# Patient Record
Sex: FEMALE | Race: WHITE | NOT HISPANIC OR LATINO
[De-identification: names, ages, dates, MRNs, and addresses within clinical notes are randomized per-mention and may not be internally consistent; named-entity substitution may affect disease eponyms.]

---

## 2017-01-04 ENCOUNTER — APPOINTMENT (OUTPATIENT)
Dept: INTERNAL MEDICINE | Facility: CLINIC | Age: 47
End: 2017-01-04

## 2017-01-04 VITALS
TEMPERATURE: 98.4 F | HEART RATE: 73 BPM | WEIGHT: 152 LBS | SYSTOLIC BLOOD PRESSURE: 121 MMHG | HEIGHT: 67 IN | RESPIRATION RATE: 17 BRPM | OXYGEN SATURATION: 95 % | BODY MASS INDEX: 23.86 KG/M2 | DIASTOLIC BLOOD PRESSURE: 80 MMHG

## 2017-01-05 ENCOUNTER — APPOINTMENT (OUTPATIENT)
Dept: GASTROENTEROLOGY | Facility: CLINIC | Age: 47
End: 2017-01-05

## 2017-01-05 LAB
ANION GAP SERPL CALC-SCNC: 16 MMOL/L
BASOPHILS # BLD AUTO: 0.01 K/UL
BASOPHILS NFR BLD AUTO: 0.2 %
BUN SERPL-MCNC: 9 MG/DL
CALCIUM SERPL-MCNC: 9.1 MG/DL
CHLORIDE SERPL-SCNC: 102 MMOL/L
CO2 SERPL-SCNC: 23 MMOL/L
CREAT SERPL-MCNC: 0.74 MG/DL
EOSINOPHIL # BLD AUTO: 0.07 K/UL
EOSINOPHIL NFR BLD AUTO: 1.2 %
GLUCOSE SERPL-MCNC: 88 MG/DL
HCT VFR BLD CALC: 42 %
HGB BLD-MCNC: 13.4 G/DL
IMM GRANULOCYTES NFR BLD AUTO: 0.2 %
LYMPHOCYTES # BLD AUTO: 0.64 K/UL
LYMPHOCYTES NFR BLD AUTO: 11.2 %
MAN DIFF?: NORMAL
MCHC RBC-ENTMCNC: 27.3 PG
MCHC RBC-ENTMCNC: 31.9 GM/DL
MCV RBC AUTO: 85.5 FL
MONOCYTES # BLD AUTO: 0.5 K/UL
MONOCYTES NFR BLD AUTO: 8.7 %
NEUTROPHILS # BLD AUTO: 4.49 K/UL
NEUTROPHILS NFR BLD AUTO: 78.5 %
PLATELET # BLD AUTO: 215 K/UL
POTASSIUM SERPL-SCNC: 4.3 MMOL/L
RBC # BLD: 4.91 M/UL
RBC # FLD: 14.6 %
SODIUM SERPL-SCNC: 141 MMOL/L
WBC # FLD AUTO: 5.72 K/UL

## 2017-01-06 ENCOUNTER — APPOINTMENT (OUTPATIENT)
Dept: CT IMAGING | Facility: CLINIC | Age: 47
End: 2017-01-06

## 2017-01-06 ENCOUNTER — OUTPATIENT (OUTPATIENT)
Dept: OUTPATIENT SERVICES | Facility: HOSPITAL | Age: 47
LOS: 1 days | End: 2017-01-06
Payer: MEDICAID

## 2017-01-06 ENCOUNTER — APPOINTMENT (OUTPATIENT)
Dept: PULMONOLOGY | Facility: CLINIC | Age: 47
End: 2017-01-06

## 2017-01-06 ENCOUNTER — MESSAGE (OUTPATIENT)
Age: 47
End: 2017-01-06

## 2017-01-06 DIAGNOSIS — R05 COUGH: ICD-10-CM

## 2017-01-06 DIAGNOSIS — R50.9 FEVER, UNSPECIFIED: ICD-10-CM

## 2017-01-06 PROCEDURE — 71250 CT THORAX DX C-: CPT

## 2017-01-06 RX ORDER — IPRATROPIUM BROMIDE AND ALBUTEROL SULFATE 2.5; .5 MG/3ML; MG/3ML
0.5-2.5 (3) SOLUTION RESPIRATORY (INHALATION)
Qty: 3 | Refills: 1 | Status: ACTIVE | COMMUNITY
Start: 1900-01-01 | End: 1900-01-01

## 2017-01-06 RX ORDER — LORAZEPAM 1 MG/1
1 TABLET ORAL
Refills: 0 | Status: ACTIVE | COMMUNITY

## 2017-01-09 LAB
FLUAV H1 2009 PAND RNA SPEC QL NAA+PROBE: DETECTED
HCOV OC43 RNA SPEC QL NAA+PROBE: DETECTED
RAPID RVP RESULT: DETECTED

## 2017-01-10 LAB
B PERT IGG SER-ACNC: 4.27 INDEX
B PERT IGM SER-ACNC: <1 INDEX

## 2017-01-11 RX ORDER — BUDESONIDE AND FORMOTEROL FUMARATE DIHYDRATE 160; 4.5 UG/1; UG/1
160-4.5 AEROSOL RESPIRATORY (INHALATION) TWICE DAILY
Qty: 1 | Refills: 1 | Status: DISCONTINUED | COMMUNITY
Start: 2017-01-06 | End: 2017-01-11

## 2017-02-01 ENCOUNTER — RX RENEWAL (OUTPATIENT)
Age: 47
End: 2017-02-01

## 2017-02-07 ENCOUNTER — MESSAGE (OUTPATIENT)
Age: 47
End: 2017-02-07

## 2017-02-28 ENCOUNTER — RX RENEWAL (OUTPATIENT)
Age: 47
End: 2017-02-28

## 2017-03-21 ENCOUNTER — APPOINTMENT (OUTPATIENT)
Dept: INTERNAL MEDICINE | Facility: CLINIC | Age: 47
End: 2017-03-21

## 2017-03-21 ENCOUNTER — LABORATORY RESULT (OUTPATIENT)
Age: 47
End: 2017-03-21

## 2017-03-21 VITALS
HEART RATE: 76 BPM | TEMPERATURE: 98.9 F | RESPIRATION RATE: 17 BRPM | OXYGEN SATURATION: 96 % | HEIGHT: 67 IN | BODY MASS INDEX: 23.54 KG/M2 | DIASTOLIC BLOOD PRESSURE: 70 MMHG | SYSTOLIC BLOOD PRESSURE: 100 MMHG | WEIGHT: 150 LBS

## 2017-03-21 LAB
BILIRUB UR QL STRIP: NEGATIVE
CLARITY UR: NORMAL
COLLECTION METHOD: NORMAL
GLUCOSE UR-MCNC: NEGATIVE
HCG UR QL: 0.2 EU/DL
HGB UR QL STRIP.AUTO: NEGATIVE
KETONES UR-MCNC: NEGATIVE
LEUKOCYTE ESTERASE UR QL STRIP: NEGATIVE
NITRITE UR QL STRIP: NEGATIVE
PH UR STRIP: 6.5
PROT UR STRIP-MCNC: NEGATIVE
SP GR UR STRIP: 1.01

## 2017-03-21 RX ORDER — DOXYCYCLINE HYCLATE 100 MG/1
100 CAPSULE ORAL
Refills: 0 | Status: DISCONTINUED | COMMUNITY
End: 2017-03-21

## 2017-03-21 RX ORDER — LEVOFLOXACIN 500 MG/1
500 TABLET, FILM COATED ORAL
Qty: 14 | Refills: 0 | Status: DISCONTINUED | COMMUNITY
Start: 2017-01-06 | End: 2017-03-21

## 2017-03-21 RX ORDER — HYDROCODONE BITARTRATE AND HOMATROPINE METHYLBROMIDE 5; 1.5 MG/5ML; MG/5ML
5-1.5 SYRUP ORAL
Qty: 100 | Refills: 0 | Status: DISCONTINUED | COMMUNITY
Start: 2017-01-04 | End: 2017-03-21

## 2017-03-21 RX ORDER — HYOSCYAMINE SULFATE 0.12 MG/1
0.12 TABLET SUBLINGUAL
Qty: 30 | Refills: 0 | Status: DISCONTINUED | COMMUNITY
Start: 2016-11-18 | End: 2017-03-21

## 2017-03-21 RX ORDER — PREDNISONE 20 MG/1
20 TABLET ORAL
Qty: 32 | Refills: 0 | Status: DISCONTINUED | COMMUNITY
Start: 2017-01-04 | End: 2017-03-21

## 2017-03-21 RX ORDER — DOXYCYCLINE 100 MG/1
100 CAPSULE ORAL
Qty: 60 | Refills: 0 | Status: COMPLETED | COMMUNITY
Start: 2016-12-27

## 2017-03-21 RX ORDER — BUDESONIDE 180 UG/1
180 AEROSOL, POWDER RESPIRATORY (INHALATION) TWICE DAILY
Qty: 3 | Refills: 3 | Status: DISCONTINUED | COMMUNITY
End: 2017-03-21

## 2017-03-21 RX ORDER — FLUNISOLIDE 80 UG/1
80 AEROSOL, METERED RESPIRATORY (INHALATION)
Qty: 9 | Refills: 0 | Status: DISCONTINUED | COMMUNITY
Start: 2016-12-24 | End: 2017-03-21

## 2017-03-22 ENCOUNTER — RX RENEWAL (OUTPATIENT)
Age: 47
End: 2017-03-22

## 2017-03-22 ENCOUNTER — OTHER (OUTPATIENT)
Age: 47
End: 2017-03-22

## 2017-03-23 LAB
APPEARANCE: CLEAR
BACTERIA UR CULT: NORMAL
BILIRUBIN URINE: NEGATIVE
BLOOD URINE: NEGATIVE
COLOR: YELLOW
GLUCOSE QUALITATIVE U: NORMAL
KETONES URINE: NEGATIVE
LEUKOCYTE ESTERASE URINE: NEGATIVE
NITRITE URINE: NEGATIVE
PH URINE: 7
PROTEIN URINE: 30
SPECIFIC GRAVITY URINE: 1.02
UROBILINOGEN URINE: NORMAL

## 2017-04-20 ENCOUNTER — RX RENEWAL (OUTPATIENT)
Age: 47
End: 2017-04-20

## 2017-05-14 ENCOUNTER — TRANSCRIPTION ENCOUNTER (OUTPATIENT)
Age: 47
End: 2017-05-14

## 2017-06-05 ENCOUNTER — APPOINTMENT (OUTPATIENT)
Dept: INTERNAL MEDICINE | Facility: CLINIC | Age: 47
End: 2017-06-05

## 2017-06-14 ENCOUNTER — NON-APPOINTMENT (OUTPATIENT)
Age: 47
End: 2017-06-14

## 2017-06-14 ENCOUNTER — APPOINTMENT (OUTPATIENT)
Dept: INTERNAL MEDICINE | Facility: CLINIC | Age: 47
End: 2017-06-14

## 2017-06-14 ENCOUNTER — APPOINTMENT (OUTPATIENT)
Dept: CARDIOLOGY | Facility: CLINIC | Age: 47
End: 2017-06-14

## 2017-06-14 ENCOUNTER — LABORATORY RESULT (OUTPATIENT)
Age: 47
End: 2017-06-14

## 2017-06-14 VITALS
HEIGHT: 67 IN | BODY MASS INDEX: 23.54 KG/M2 | OXYGEN SATURATION: 98 % | DIASTOLIC BLOOD PRESSURE: 78 MMHG | TEMPERATURE: 98.6 F | HEART RATE: 68 BPM | SYSTOLIC BLOOD PRESSURE: 118 MMHG | RESPIRATION RATE: 16 BRPM | WEIGHT: 150 LBS

## 2017-06-14 RX ORDER — MELOXICAM 15 MG/1
15 TABLET ORAL
Qty: 10 | Refills: 0 | Status: DISCONTINUED | COMMUNITY
Start: 2017-03-21 | End: 2017-06-14

## 2017-06-16 ENCOUNTER — APPOINTMENT (OUTPATIENT)
Dept: CARDIOLOGY | Facility: CLINIC | Age: 47
End: 2017-06-16

## 2017-06-16 LAB
ALBUMIN SERPL ELPH-MCNC: 4.6 G/DL
ALP BLD-CCNC: 77 U/L
ALT SERPL-CCNC: 23 U/L
ANION GAP SERPL CALC-SCNC: 15 MMOL/L
AST SERPL-CCNC: 22 U/L
B BURGDOR IGG+IGM SER QL IB: NORMAL
BASOPHILS # BLD AUTO: 0.02 K/UL
BASOPHILS NFR BLD AUTO: 0.3 %
BILIRUB SERPL-MCNC: 0.2 MG/DL
BUN SERPL-MCNC: 20 MG/DL
CALCIUM SERPL-MCNC: 9.9 MG/DL
CHLORIDE SERPL-SCNC: 103 MMOL/L
CO2 SERPL-SCNC: 22 MMOL/L
CREAT SERPL-MCNC: 0.84 MG/DL
CRP SERPL-MCNC: <0.2 MG/DL
EOSINOPHIL # BLD AUTO: 0.2 K/UL
EOSINOPHIL NFR BLD AUTO: 3.3 %
GLUCOSE SERPL-MCNC: 90 MG/DL
HCT VFR BLD CALC: 43.9 %
HGB BLD-MCNC: 14.3 G/DL
HIV1+2 AB SPEC QL IA.RAPID: NONREACTIVE
IMM GRANULOCYTES NFR BLD AUTO: 0.2 %
LYMPHOCYTES # BLD AUTO: 1.67 K/UL
LYMPHOCYTES NFR BLD AUTO: 27.6 %
MAN DIFF?: NORMAL
MCHC RBC-ENTMCNC: 27.4 PG
MCHC RBC-ENTMCNC: 32.6 GM/DL
MCV RBC AUTO: 84.3 FL
MONOCYTES # BLD AUTO: 0.47 K/UL
MONOCYTES NFR BLD AUTO: 7.8 %
NEUTROPHILS # BLD AUTO: 3.68 K/UL
NEUTROPHILS NFR BLD AUTO: 60.8 %
PLATELET # BLD AUTO: 241 K/UL
POTASSIUM SERPL-SCNC: 4.5 MMOL/L
PROT SERPL-MCNC: 7.7 G/DL
RBC # BLD: 5.21 M/UL
RBC # FLD: 14.8 %
SODIUM SERPL-SCNC: 140 MMOL/L
TSH SERPL-ACNC: 2.11 UIU/ML
WBC # FLD AUTO: 6.05 K/UL

## 2017-07-12 ENCOUNTER — RX RENEWAL (OUTPATIENT)
Age: 47
End: 2017-07-12

## 2017-07-13 ENCOUNTER — MEDICATION RENEWAL (OUTPATIENT)
Age: 47
End: 2017-07-13

## 2017-09-11 ENCOUNTER — MEDICATION RENEWAL (OUTPATIENT)
Age: 47
End: 2017-09-11

## 2017-10-11 ENCOUNTER — MEDICATION RENEWAL (OUTPATIENT)
Age: 47
End: 2017-10-11

## 2017-10-11 ENCOUNTER — RX RENEWAL (OUTPATIENT)
Age: 47
End: 2017-10-11

## 2017-10-23 ENCOUNTER — APPOINTMENT (OUTPATIENT)
Dept: INTERNAL MEDICINE | Facility: CLINIC | Age: 47
End: 2017-10-23
Payer: MEDICAID

## 2017-10-23 VITALS
OXYGEN SATURATION: 99 % | SYSTOLIC BLOOD PRESSURE: 112 MMHG | HEART RATE: 77 BPM | BODY MASS INDEX: 23.54 KG/M2 | HEIGHT: 67 IN | RESPIRATION RATE: 17 BRPM | DIASTOLIC BLOOD PRESSURE: 78 MMHG | WEIGHT: 150 LBS | TEMPERATURE: 98.9 F

## 2017-10-23 PROCEDURE — 99214 OFFICE O/P EST MOD 30 MIN: CPT

## 2017-10-23 RX ORDER — MOMETASONE FUROATE AND FORMOTEROL FUMARATE DIHYDRATE 200; 5 UG/1; UG/1
200-5 AEROSOL RESPIRATORY (INHALATION) TWICE DAILY
Qty: 1 | Refills: 3 | Status: DISCONTINUED | COMMUNITY
Start: 2017-01-11 | End: 2017-10-23

## 2017-10-24 LAB
ENA SS-A AB SER IA-ACNC: <0.2 AL
ENA SS-B AB SER IA-ACNC: <0.2 AL

## 2017-12-02 ENCOUNTER — MEDICATION RENEWAL (OUTPATIENT)
Age: 47
End: 2017-12-02

## 2017-12-04 ENCOUNTER — APPOINTMENT (OUTPATIENT)
Dept: OBGYN | Facility: CLINIC | Age: 47
End: 2017-12-04

## 2017-12-08 ENCOUNTER — RX RENEWAL (OUTPATIENT)
Age: 47
End: 2017-12-08

## 2017-12-12 ENCOUNTER — APPOINTMENT (OUTPATIENT)
Dept: OBGYN | Facility: CLINIC | Age: 47
End: 2017-12-12
Payer: MEDICAID

## 2017-12-12 VITALS
HEIGHT: 67 IN | DIASTOLIC BLOOD PRESSURE: 70 MMHG | BODY MASS INDEX: 24.17 KG/M2 | WEIGHT: 154 LBS | SYSTOLIC BLOOD PRESSURE: 120 MMHG

## 2017-12-12 LAB
BILIRUB UR QL STRIP: NORMAL
GLUCOSE UR-MCNC: NORMAL
HCG UR QL: 0.2 EU/DL
HCG UR QL: NEGATIVE
HGB UR QL STRIP.AUTO: NORMAL
KETONES UR-MCNC: NORMAL
LEUKOCYTE ESTERASE UR QL STRIP: NORMAL
NITRITE UR QL STRIP: NORMAL
PH UR STRIP: 5.5
PROT UR STRIP-MCNC: NORMAL
QUALITY CONTROL: YES
SP GR UR STRIP: 1.02

## 2017-12-12 PROCEDURE — 81025 URINE PREGNANCY TEST: CPT

## 2017-12-12 PROCEDURE — 99213 OFFICE O/P EST LOW 20 MIN: CPT

## 2017-12-12 PROCEDURE — 81003 URINALYSIS AUTO W/O SCOPE: CPT | Mod: QW

## 2017-12-18 ENCOUNTER — TRANSCRIPTION ENCOUNTER (OUTPATIENT)
Age: 47
End: 2017-12-18

## 2017-12-20 ENCOUNTER — APPOINTMENT (OUTPATIENT)
Dept: OBGYN | Facility: CLINIC | Age: 47
End: 2017-12-20

## 2017-12-21 ENCOUNTER — OUTPATIENT (OUTPATIENT)
Dept: OUTPATIENT SERVICES | Facility: HOSPITAL | Age: 47
LOS: 1 days | End: 2017-12-21

## 2017-12-21 VITALS
WEIGHT: 154.1 LBS | HEIGHT: 67 IN | SYSTOLIC BLOOD PRESSURE: 112 MMHG | HEART RATE: 68 BPM | RESPIRATION RATE: 14 BRPM | DIASTOLIC BLOOD PRESSURE: 70 MMHG | TEMPERATURE: 97 F

## 2017-12-21 DIAGNOSIS — N83.209 UNSPECIFIED OVARIAN CYST, UNSPECIFIED SIDE: ICD-10-CM

## 2017-12-21 DIAGNOSIS — R52 PAIN, UNSPECIFIED: Chronic | ICD-10-CM

## 2017-12-21 DIAGNOSIS — N63.20 UNSPECIFIED LUMP IN THE LEFT BREAST, UNSPECIFIED QUADRANT: Chronic | ICD-10-CM

## 2017-12-21 DIAGNOSIS — N83.201 UNSPECIFIED OVARIAN CYST, RIGHT SIDE: ICD-10-CM

## 2017-12-21 DIAGNOSIS — R59.0 LOCALIZED ENLARGED LYMPH NODES: Chronic | ICD-10-CM

## 2017-12-21 LAB
BLD GP AB SCN SERPL QL: NEGATIVE — SIGNIFICANT CHANGE UP
BUN SERPL-MCNC: 22 MG/DL — SIGNIFICANT CHANGE UP (ref 7–23)
CALCIUM SERPL-MCNC: 9.2 MG/DL — SIGNIFICANT CHANGE UP (ref 8.4–10.5)
CHLORIDE SERPL-SCNC: 103 MMOL/L — SIGNIFICANT CHANGE UP (ref 98–107)
CO2 SERPL-SCNC: 26 MMOL/L — SIGNIFICANT CHANGE UP (ref 22–31)
CREAT SERPL-MCNC: 0.89 MG/DL — SIGNIFICANT CHANGE UP (ref 0.5–1.3)
GLUCOSE SERPL-MCNC: 73 MG/DL — SIGNIFICANT CHANGE UP (ref 70–99)
HCG SERPL-ACNC: < 5 MIU/ML — SIGNIFICANT CHANGE UP
HCT VFR BLD CALC: 43.4 % — SIGNIFICANT CHANGE UP (ref 34.5–45)
HGB BLD-MCNC: 13.9 G/DL — SIGNIFICANT CHANGE UP (ref 11.5–15.5)
MCHC RBC-ENTMCNC: 27.7 PG — SIGNIFICANT CHANGE UP (ref 27–34)
MCHC RBC-ENTMCNC: 32 % — SIGNIFICANT CHANGE UP (ref 32–36)
MCV RBC AUTO: 86.6 FL — SIGNIFICANT CHANGE UP (ref 80–100)
NRBC # FLD: 0 — SIGNIFICANT CHANGE UP
PLATELET # BLD AUTO: 250 K/UL — SIGNIFICANT CHANGE UP (ref 150–400)
PMV BLD: 10.6 FL — SIGNIFICANT CHANGE UP (ref 7–13)
POTASSIUM SERPL-MCNC: 4.1 MMOL/L — SIGNIFICANT CHANGE UP (ref 3.5–5.3)
POTASSIUM SERPL-SCNC: 4.1 MMOL/L — SIGNIFICANT CHANGE UP (ref 3.5–5.3)
RBC # BLD: 5.01 M/UL — SIGNIFICANT CHANGE UP (ref 3.8–5.2)
RBC # FLD: 14.2 % — SIGNIFICANT CHANGE UP (ref 10.3–14.5)
RH IG SCN BLD-IMP: POSITIVE — SIGNIFICANT CHANGE UP
SODIUM SERPL-SCNC: 143 MMOL/L — SIGNIFICANT CHANGE UP (ref 135–145)
WBC # BLD: 8.64 K/UL — SIGNIFICANT CHANGE UP (ref 3.8–10.5)
WBC # FLD AUTO: 8.64 K/UL — SIGNIFICANT CHANGE UP (ref 3.8–10.5)

## 2017-12-21 NOTE — H&P PST ADULT - RESPIRATORY AND THORAX COMMENTS
recent sinus infection -- completing antibiotic on 12-21-17 recent sinus infection -- completing antibiotic on 12-21-17 -- no symptoms at present

## 2017-12-21 NOTE — H&P PST ADULT - PROBLEM SELECTOR PLAN 1
This is a  46 (to be 47 on 12-24-17) y/o female who is scheduled for lap ovarian cystectomy on 1-4-17  * Given scrub cleanser  * Instructed to take normal am dose of Allegra, omeprazole, Aerospan, and albuterol the am of surgery

## 2017-12-21 NOTE — H&P PST ADULT - HISTORY OF PRESENT ILLNESS
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 Visited MD with subsequent sonogram confirming ovarian cyst. Scheduled for lap ovarian cystectomy on 1-4-17

## 2017-12-21 NOTE — H&P PST ADULT - NS PRO LAST MENSTRUAL DATE
October 10, 2017 -- sent [facility] [place] and given urine cup to bring specimen the am of surgery

## 2017-12-21 NOTE — H&P PST ADULT - PMH
Anemia    Asthma    Depression with anxiety    Excessive menstruation    GERD (gastroesophageal reflux disease)    Hypothyroid    Migraines    MVP (mitral valve prolapse)    Ovarian cyst  Dec. 2017  Right thyroid nodule    Sinus infection  completed antibiotic -- clindamyacin today, 12-21-17 Anemia    Asthma    Depression with anxiety    Environmental allergies    Excessive menstruation    GERD (gastroesophageal reflux disease)    Hypothyroid    Migraines    MVP (mitral valve prolapse)    Ovarian cyst  Dec. 2017  Pneumonia  2015  Right thyroid nodule    Sinus infection  completed antibiotic -- clindamyacin today, 12-21-17

## 2017-12-21 NOTE — H&P PST ADULT - LYMPHATIC
anterior cervical L/posterior cervical L/supraclavicular L/anterior cervical R/supraclavicular R/posterior cervical R

## 2017-12-21 NOTE — H&P PST ADULT - NSANTHOSAYNRD_GEN_A_CORE
No. JONG screening performed.  STOP BANG Legend: 0-2 = LOW Risk; 3-4 = INTERMEDIATE Risk; 5-8 = HIGH Risk

## 2017-12-21 NOTE — H&P PST ADULT - PSH
Breast mass, left  lumpectomy  Enlarged lymph nodes in armpit  excision of left benign lymph node of axilla region  H/O cone biopsy of cervix  20 yrs ago  Pain  sinus surgery  S/P appendectomy  '04  S/P dilatation and curettage  miscarriage '06  S/P laparoscopic cholecystectomy '06

## 2017-12-21 NOTE — H&P PST ADULT - ATTENDING COMMENTS
Right ovarian cystectomy.  Possible oophorectomy.  Discussed possible laparotomy and left ovarian cystectomy.  Questions answered. Consent signed.  Discussed bleeding infection and damage to adjacent organs

## 2018-01-01 ENCOUNTER — OUTPATIENT (OUTPATIENT)
Dept: OUTPATIENT SERVICES | Facility: HOSPITAL | Age: 48
LOS: 1 days | End: 2018-01-01
Payer: MEDICAID

## 2018-01-01 DIAGNOSIS — R59.0 LOCALIZED ENLARGED LYMPH NODES: Chronic | ICD-10-CM

## 2018-01-01 DIAGNOSIS — N63.20 UNSPECIFIED LUMP IN THE LEFT BREAST, UNSPECIFIED QUADRANT: Chronic | ICD-10-CM

## 2018-01-01 DIAGNOSIS — R52 PAIN, UNSPECIFIED: Chronic | ICD-10-CM

## 2018-01-04 ENCOUNTER — APPOINTMENT (OUTPATIENT)
Dept: OBGYN | Facility: CLINIC | Age: 48
End: 2018-01-04

## 2018-01-10 RX ORDER — SODIUM CHLORIDE 9 MG/ML
1000 INJECTION, SOLUTION INTRAVENOUS
Qty: 0 | Refills: 0 | Status: DISCONTINUED | OUTPATIENT
Start: 2018-01-11 | End: 2018-01-12

## 2018-01-10 NOTE — ASU PATIENT PROFILE, ADULT - PMH
Anemia    Asthma    Depression with anxiety    Environmental allergies    Excessive menstruation    GERD (gastroesophageal reflux disease)    Hypothyroid    Migraines    MVP (mitral valve prolapse)    Ovarian cyst  Dec. 2017  Pneumonia  2015  Right thyroid nodule    Sinus infection  completed antibiotic -- clindamyacin today, 12-21-17

## 2018-01-11 ENCOUNTER — APPOINTMENT (OUTPATIENT)
Dept: OBGYN | Facility: CLINIC | Age: 48
End: 2018-01-11

## 2018-01-11 ENCOUNTER — OUTPATIENT (OUTPATIENT)
Dept: OUTPATIENT SERVICES | Facility: HOSPITAL | Age: 48
LOS: 1 days | Discharge: HOME | End: 2018-01-11
Payer: MEDICAID

## 2018-01-11 ENCOUNTER — RESULT REVIEW (OUTPATIENT)
Age: 48
End: 2018-01-11

## 2018-01-11 VITALS
DIASTOLIC BLOOD PRESSURE: 67 MMHG | SYSTOLIC BLOOD PRESSURE: 111 MMHG | TEMPERATURE: 98 F | OXYGEN SATURATION: 97 % | RESPIRATION RATE: 14 BRPM | HEART RATE: 82 BPM

## 2018-01-11 VITALS
SYSTOLIC BLOOD PRESSURE: 121 MMHG | HEART RATE: 86 BPM | OXYGEN SATURATION: 98 % | DIASTOLIC BLOOD PRESSURE: 86 MMHG | WEIGHT: 154.1 LBS | HEIGHT: 67 IN | TEMPERATURE: 98 F | RESPIRATION RATE: 14 BRPM

## 2018-01-11 DIAGNOSIS — N83.209 UNSPECIFIED OVARIAN CYST, UNSPECIFIED SIDE: ICD-10-CM

## 2018-01-11 DIAGNOSIS — R59.0 LOCALIZED ENLARGED LYMPH NODES: Chronic | ICD-10-CM

## 2018-01-11 DIAGNOSIS — N63.20 UNSPECIFIED LUMP IN THE LEFT BREAST, UNSPECIFIED QUADRANT: Chronic | ICD-10-CM

## 2018-01-11 DIAGNOSIS — R52 PAIN, UNSPECIFIED: Chronic | ICD-10-CM

## 2018-01-11 LAB
BLD GP AB SCN SERPL QL: NEGATIVE — SIGNIFICANT CHANGE UP
RH IG SCN BLD-IMP: POSITIVE — SIGNIFICANT CHANGE UP

## 2018-01-11 PROCEDURE — 88305 TISSUE EXAM BY PATHOLOGIST: CPT | Mod: 26

## 2018-01-11 PROCEDURE — 58661 LAPAROSCOPY REMOVE ADNEXA: CPT | Mod: GC

## 2018-01-11 RX ORDER — SODIUM CHLORIDE 9 MG/ML
3 INJECTION INTRAMUSCULAR; INTRAVENOUS; SUBCUTANEOUS ONCE
Qty: 0 | Refills: 0 | Status: DISCONTINUED | OUTPATIENT
Start: 2018-01-11 | End: 2018-01-12

## 2018-01-11 RX ADMIN — SODIUM CHLORIDE 30 MILLILITER(S): 9 INJECTION, SOLUTION INTRAVENOUS at 12:08

## 2018-01-11 NOTE — ASU DISCHARGE PLAN (ADULT/PEDIATRIC). - NOTIFY
Persistent Nausea and Vomiting/Inability to Tolerate Liquids or Foods/Bleeding that does not stop/Unable to Urinate/Excessive Diarrhea/Pain not relieved by Medications/GYN Fever>100.4 GYN Fever>100.4/Excessive Diarrhea/Bleeding that does not stop/Pain not relieved by Medications/Persistent Nausea and Vomiting/Unable to Urinate/Signs of infection: redness around surgical site, hot and tender to the touch, pus drainage of any kind accompanied by foul odor/Inability to Tolerate Liquids or Foods

## 2018-01-11 NOTE — ASU DISCHARGE PLAN (ADULT/PEDIATRIC). - MEDICATION SUMMARY - MEDICATIONS TO TAKE
I will START or STAY ON the medications listed below when I get home from the hospital:    Percocet 5/325 oral tablet  -- 1 tab(s) by mouth 4 times a day MDD:4  -- Caution federal law prohibits the transfer of this drug to any person other  than the person for whom it was prescribed.  May cause drowsiness.  Alcohol may intensify this effect.  Use care when operating dangerous machinery.  This prescription cannot be refilled.  This product contains acetaminophen.  Do not use  with any other product containing acetaminophen to prevent possible liver damage.  Using more of this medication than prescribed may cause serious breathing problems.    -- Indication: For pain as needed    Ativan 0.5 mg oral tablet  -- 1 tab(s) by mouth 3 times a day, As Needed  -- Indication: For home med    Zoloft 100 mg oral tablet  -- 2 tab(s) by mouth once a day in pm  -- Indication: For home med    Allegra 180 mg oral tablet  -- 1 tab(s) by mouth once a day  -- Indication: For home med    albuterol  -- 2 oral inhalations 2x/day  -- Indication: For home med    clindamycin  -- one tablet 2x/day for sinus infection -- to be completed on 12-21-17  -- Indication: For home med    omeprazole 40 mg oral delayed release capsule  -- 1 cap(s) by mouth once a day in am  -- Indication: For home med    Aerospan 80 mcg/inh inhalation aerosol  -- one spray by mouth 2/day  -- Indication: For home med    levothyroxine 75 mcg (0.075 mg) oral tablet  -- 1 tab(s) by mouth once a day in pm  -- Indication: For home med

## 2018-01-11 NOTE — BRIEF OPERATIVE NOTE - OPERATION/FINDINGS
9cm right ovarian cyst with hemorrhagic contents, initially stuck in the cul-de-sac. enlarged 12 week sized uterus 9cm right ovarian cyst with hemorrhagic contents, initially stuck in the cul-de-sac. enlarged 12 week sized uterus. dictation [id number]

## 2018-01-11 NOTE — BRIEF OPERATIVE NOTE - PROCEDURE
<<-----Click on this checkbox to enter Procedure Partial salpingectomy  01/11/2018    Active  LSTORK  Ovarian cystectomy  01/11/2018    Active  LSTORK  Partial oophorectomy  01/11/2018  right  Active  LSTORK

## 2018-01-11 NOTE — ASU DISCHARGE PLAN (ADULT/PEDIATRIC). - ACTIVITY LEVEL
no exercise/no sports/gym/no douching/no heavy lifting/no tampons/no tub baths/nothing per vagina/no intercourse no exercise/nothing per vagina/no tub baths/Nothing in vagina, no intercourse, no douching, no tampons, no tub baths, and no swimming for about 2 weeks or until cleared by MD. Contact your doctor if you are soaking a pad every hour or experience foul smelling discharge./no heavy lifting/no tampons/no sports/gym/no douching/no intercourse

## 2018-01-11 NOTE — ASU DISCHARGE PLAN (ADULT/PEDIATRIC). - NURSING INSTRUCTIONS
You were given IV Tylenol for pain management in the Operating Room.  Please NOT take tylenol/acetaminophen products for the next 6-8 hours (after  730PM ).  You were given Toradol for pain management in the Operating Room. Please do NOT take Ibuprofen (NSAIDS) products (Motrin, Aleve, Advil, Excedrin) for the next 6-8 hours (After 900PM).  Refer to medication reconcillation sheet attached with discharge instruction paperwork.

## 2018-01-12 ENCOUNTER — TRANSCRIPTION ENCOUNTER (OUTPATIENT)
Age: 48
End: 2018-01-12

## 2018-01-17 LAB — SURGICAL PATHOLOGY STUDY: SIGNIFICANT CHANGE UP

## 2018-01-18 ENCOUNTER — OTHER (OUTPATIENT)
Age: 48
End: 2018-01-18

## 2018-01-18 ENCOUNTER — RX RENEWAL (OUTPATIENT)
Age: 48
End: 2018-01-18

## 2018-01-26 ENCOUNTER — APPOINTMENT (OUTPATIENT)
Dept: OBGYN | Facility: CLINIC | Age: 48
End: 2018-01-26
Payer: MEDICAID

## 2018-01-26 VITALS
BODY MASS INDEX: 24.48 KG/M2 | DIASTOLIC BLOOD PRESSURE: 80 MMHG | HEIGHT: 67 IN | SYSTOLIC BLOOD PRESSURE: 122 MMHG | WEIGHT: 156 LBS

## 2018-01-26 DIAGNOSIS — R10.2 PELVIC AND PERINEAL PAIN: ICD-10-CM

## 2018-01-26 DIAGNOSIS — R73.09 OTHER ABNORMAL GLUCOSE: ICD-10-CM

## 2018-01-26 DIAGNOSIS — J06.9 ACUTE UPPER RESPIRATORY INFECTION, UNSPECIFIED: ICD-10-CM

## 2018-01-26 DIAGNOSIS — Z87.09 PERSONAL HISTORY OF OTHER DISEASES OF THE RESPIRATORY SYSTEM: ICD-10-CM

## 2018-01-26 DIAGNOSIS — H10.33 UNSPECIFIED ACUTE CONJUNCTIVITIS, BILATERAL: ICD-10-CM

## 2018-01-26 DIAGNOSIS — Z87.19 PERSONAL HISTORY OF OTHER DISEASES OF THE DIGESTIVE SYSTEM: ICD-10-CM

## 2018-01-26 PROCEDURE — 99024 POSTOP FOLLOW-UP VISIT: CPT

## 2018-01-29 ENCOUNTER — TRANSCRIPTION ENCOUNTER (OUTPATIENT)
Age: 48
End: 2018-01-29

## 2018-01-29 ENCOUNTER — INPATIENT (INPATIENT)
Facility: HOSPITAL | Age: 48
LOS: 3 days | Discharge: HOME | End: 2018-02-02
Attending: OBSTETRICS & GYNECOLOGY | Admitting: OBSTETRICS & GYNECOLOGY
Payer: MEDICAID

## 2018-01-29 VITALS
TEMPERATURE: 99 F | HEART RATE: 98 BPM | DIASTOLIC BLOOD PRESSURE: 80 MMHG | OXYGEN SATURATION: 100 % | RESPIRATION RATE: 20 BRPM | SYSTOLIC BLOOD PRESSURE: 116 MMHG

## 2018-01-29 DIAGNOSIS — R52 PAIN, UNSPECIFIED: Chronic | ICD-10-CM

## 2018-01-29 DIAGNOSIS — N63.20 UNSPECIFIED LUMP IN THE LEFT BREAST, UNSPECIFIED QUADRANT: Chronic | ICD-10-CM

## 2018-01-29 DIAGNOSIS — R59.0 LOCALIZED ENLARGED LYMPH NODES: Chronic | ICD-10-CM

## 2018-01-29 LAB
ALBUMIN SERPL ELPH-MCNC: 3.9 G/DL — SIGNIFICANT CHANGE UP (ref 3.3–5)
ALP SERPL-CCNC: 64 U/L — SIGNIFICANT CHANGE UP (ref 40–120)
ALT FLD-CCNC: 32 U/L — SIGNIFICANT CHANGE UP (ref 4–33)
APPEARANCE UR: CLEAR — SIGNIFICANT CHANGE UP
AST SERPL-CCNC: 41 U/L — HIGH (ref 4–32)
BACTERIA # UR AUTO: SIGNIFICANT CHANGE UP
BASE EXCESS BLDV CALC-SCNC: -0.8 MMOL/L — SIGNIFICANT CHANGE UP
BASOPHILS # BLD AUTO: 0.02 K/UL — SIGNIFICANT CHANGE UP (ref 0–0.2)
BASOPHILS NFR BLD AUTO: 0.3 % — SIGNIFICANT CHANGE UP (ref 0–2)
BILIRUB SERPL-MCNC: 0.4 MG/DL — SIGNIFICANT CHANGE UP (ref 0.2–1.2)
BILIRUB UR-MCNC: NEGATIVE — SIGNIFICANT CHANGE UP
BLOOD GAS VENOUS - CREATININE: 0.59 MG/DL — SIGNIFICANT CHANGE UP (ref 0.5–1.3)
BLOOD UR QL VISUAL: NEGATIVE — SIGNIFICANT CHANGE UP
BUN SERPL-MCNC: 8 MG/DL — SIGNIFICANT CHANGE UP (ref 7–23)
CALCIUM SERPL-MCNC: 8.9 MG/DL — SIGNIFICANT CHANGE UP (ref 8.4–10.5)
CHLORIDE BLDV-SCNC: 109 MMOL/L — HIGH (ref 96–108)
CHLORIDE SERPL-SCNC: 102 MMOL/L — SIGNIFICANT CHANGE UP (ref 98–107)
CO2 SERPL-SCNC: 22 MMOL/L — SIGNIFICANT CHANGE UP (ref 22–31)
COLOR SPEC: YELLOW — SIGNIFICANT CHANGE UP
CREAT SERPL-MCNC: 0.72 MG/DL — SIGNIFICANT CHANGE UP (ref 0.5–1.3)
EOSINOPHIL # BLD AUTO: 0.11 K/UL — SIGNIFICANT CHANGE UP (ref 0–0.5)
EOSINOPHIL NFR BLD AUTO: 1.9 % — SIGNIFICANT CHANGE UP (ref 0–6)
GAS PNL BLDV: 139 MMOL/L — SIGNIFICANT CHANGE UP (ref 136–146)
GLUCOSE BLDV-MCNC: 82 — SIGNIFICANT CHANGE UP (ref 70–99)
GLUCOSE SERPL-MCNC: 83 MG/DL — SIGNIFICANT CHANGE UP (ref 70–99)
GLUCOSE UR-MCNC: NEGATIVE — SIGNIFICANT CHANGE UP
HCO3 BLDV-SCNC: 23 MMOL/L — SIGNIFICANT CHANGE UP (ref 20–27)
HCT VFR BLD CALC: 38.7 % — SIGNIFICANT CHANGE UP (ref 34.5–45)
HCT VFR BLDV CALC: 39.6 % — SIGNIFICANT CHANGE UP (ref 34.5–45)
HGB BLD-MCNC: 12.4 G/DL — SIGNIFICANT CHANGE UP (ref 11.5–15.5)
HGB BLDV-MCNC: 12.9 G/DL — SIGNIFICANT CHANGE UP (ref 11.5–15.5)
IMM GRANULOCYTES # BLD AUTO: 0.01 # — SIGNIFICANT CHANGE UP
IMM GRANULOCYTES NFR BLD AUTO: 0.2 % — SIGNIFICANT CHANGE UP (ref 0–1.5)
KETONES UR-MCNC: NEGATIVE — SIGNIFICANT CHANGE UP
LACTATE BLDV-MCNC: 0.9 MMOL/L — SIGNIFICANT CHANGE UP (ref 0.5–2)
LEUKOCYTE ESTERASE UR-ACNC: SIGNIFICANT CHANGE UP
LYMPHOCYTES # BLD AUTO: 1.29 K/UL — SIGNIFICANT CHANGE UP (ref 1–3.3)
LYMPHOCYTES # BLD AUTO: 21.7 % — SIGNIFICANT CHANGE UP (ref 13–44)
MCHC RBC-ENTMCNC: 27.1 PG — SIGNIFICANT CHANGE UP (ref 27–34)
MCHC RBC-ENTMCNC: 32 % — SIGNIFICANT CHANGE UP (ref 32–36)
MCV RBC AUTO: 84.5 FL — SIGNIFICANT CHANGE UP (ref 80–100)
MONOCYTES # BLD AUTO: 0.33 K/UL — SIGNIFICANT CHANGE UP (ref 0–0.9)
MONOCYTES NFR BLD AUTO: 5.6 % — SIGNIFICANT CHANGE UP (ref 2–14)
MUCOUS THREADS # UR AUTO: SIGNIFICANT CHANGE UP
NEUTROPHILS # BLD AUTO: 4.18 K/UL — SIGNIFICANT CHANGE UP (ref 1.8–7.4)
NEUTROPHILS NFR BLD AUTO: 70.3 % — SIGNIFICANT CHANGE UP (ref 43–77)
NITRITE UR-MCNC: NEGATIVE — SIGNIFICANT CHANGE UP
NON-SQ EPI CELLS # UR AUTO: <1 — SIGNIFICANT CHANGE UP
NRBC # FLD: 0 — SIGNIFICANT CHANGE UP
PCO2 BLDV: 41 MMHG — SIGNIFICANT CHANGE UP (ref 41–51)
PH BLDV: 7.38 PH — SIGNIFICANT CHANGE UP (ref 7.32–7.43)
PH UR: 6 — SIGNIFICANT CHANGE UP (ref 4.6–8)
PLATELET # BLD AUTO: 248 K/UL — SIGNIFICANT CHANGE UP (ref 150–400)
PMV BLD: 11 FL — SIGNIFICANT CHANGE UP (ref 7–13)
PO2 BLDV: 45 MMHG — HIGH (ref 35–40)
POTASSIUM BLDV-SCNC: 3.5 MMOL/L — SIGNIFICANT CHANGE UP (ref 3.4–4.5)
POTASSIUM SERPL-MCNC: SIGNIFICANT CHANGE UP MMOL/L (ref 3.5–5.3)
POTASSIUM SERPL-SCNC: SIGNIFICANT CHANGE UP MMOL/L (ref 3.5–5.3)
PROT SERPL-MCNC: 7.6 G/DL — SIGNIFICANT CHANGE UP (ref 6–8.3)
PROT UR-MCNC: NEGATIVE MG/DL — SIGNIFICANT CHANGE UP
RBC # BLD: 4.58 M/UL — SIGNIFICANT CHANGE UP (ref 3.8–5.2)
RBC # FLD: 13.8 % — SIGNIFICANT CHANGE UP (ref 10.3–14.5)
RBC CASTS # UR COMP ASSIST: SIGNIFICANT CHANGE UP (ref 0–?)
SAO2 % BLDV: 76.6 % — SIGNIFICANT CHANGE UP (ref 60–85)
SODIUM SERPL-SCNC: 140 MMOL/L — SIGNIFICANT CHANGE UP (ref 135–145)
SP GR SPEC: 1.02 — SIGNIFICANT CHANGE UP (ref 1–1.04)
SQUAMOUS # UR AUTO: SIGNIFICANT CHANGE UP
UROBILINOGEN FLD QL: NORMAL MG/DL — SIGNIFICANT CHANGE UP
WBC # BLD: 5.94 K/UL — SIGNIFICANT CHANGE UP (ref 3.8–10.5)
WBC # FLD AUTO: 5.94 K/UL — SIGNIFICANT CHANGE UP (ref 3.8–10.5)
WBC UR QL: SIGNIFICANT CHANGE UP (ref 0–?)

## 2018-01-29 PROCEDURE — 76830 TRANSVAGINAL US NON-OB: CPT | Mod: 26

## 2018-01-29 PROCEDURE — 74177 CT ABD & PELVIS W/CONTRAST: CPT | Mod: 26

## 2018-01-29 RX ORDER — HYDROMORPHONE HYDROCHLORIDE 2 MG/ML
1 INJECTION INTRAMUSCULAR; INTRAVENOUS; SUBCUTANEOUS ONCE
Qty: 0 | Refills: 0 | Status: DISCONTINUED | OUTPATIENT
Start: 2018-01-29 | End: 2018-01-29

## 2018-01-29 RX ORDER — SODIUM CHLORIDE 9 MG/ML
1000 INJECTION INTRAMUSCULAR; INTRAVENOUS; SUBCUTANEOUS ONCE
Qty: 0 | Refills: 0 | Status: COMPLETED | OUTPATIENT
Start: 2018-01-29 | End: 2018-01-29

## 2018-01-29 RX ORDER — ONDANSETRON 8 MG/1
4 TABLET, FILM COATED ORAL ONCE
Qty: 0 | Refills: 0 | Status: COMPLETED | OUTPATIENT
Start: 2018-01-29 | End: 2018-01-29

## 2018-01-29 RX ADMIN — HYDROMORPHONE HYDROCHLORIDE 1 MILLIGRAM(S): 2 INJECTION INTRAMUSCULAR; INTRAVENOUS; SUBCUTANEOUS at 23:20

## 2018-01-29 RX ADMIN — SODIUM CHLORIDE 1000 MILLILITER(S): 9 INJECTION INTRAMUSCULAR; INTRAVENOUS; SUBCUTANEOUS at 17:20

## 2018-01-29 RX ADMIN — HYDROMORPHONE HYDROCHLORIDE 1 MILLIGRAM(S): 2 INJECTION INTRAMUSCULAR; INTRAVENOUS; SUBCUTANEOUS at 20:45

## 2018-01-29 RX ADMIN — HYDROMORPHONE HYDROCHLORIDE 1 MILLIGRAM(S): 2 INJECTION INTRAMUSCULAR; INTRAVENOUS; SUBCUTANEOUS at 20:30

## 2018-01-29 RX ADMIN — ONDANSETRON 4 MILLIGRAM(S): 8 TABLET, FILM COATED ORAL at 17:20

## 2018-01-29 RX ADMIN — HYDROMORPHONE HYDROCHLORIDE 1 MILLIGRAM(S): 2 INJECTION INTRAMUSCULAR; INTRAVENOUS; SUBCUTANEOUS at 17:20

## 2018-01-29 RX ADMIN — HYDROMORPHONE HYDROCHLORIDE 1 MILLIGRAM(S): 2 INJECTION INTRAMUSCULAR; INTRAVENOUS; SUBCUTANEOUS at 18:50

## 2018-01-29 NOTE — ED PROVIDER NOTE - OBJECTIVE STATEMENT
This patient is a 47y female w recent partial salpingectomy, oophorectomy, ovarian cystectomy on 1/11 p/w nausea x3 days, abdominal pain, and mild dysuria. This patient is a 47y female w PMHx hypothyroidism, asthma, and recent partial salpingectomy, oophorectomy, ovarian cystectomy on 1/11 p/w severe lower abdominal pain and nausea x 3 days. She states that she was having no post surgical pain until 3 days ago when she started experiencing severe, stabbing pain in her right adnexa which occurred spontaneously and without trauma or exertion. She did not come to the ED prior to now bc she wanted to avoid respiratory infx but the pain did not samnatha so she decided to come today. Denies f/c, denies, v/d, does report nausea. No CVA tenderness, no vaginal discharge, no vaginal bleeding. She reports mild dysuria as well.

## 2018-01-29 NOTE — ED PROVIDER NOTE - ATTENDING CONTRIBUTION TO CARE
47F presents with abdominal pain. Pain started 3d ago.  Sharp, stabbing pain in R pelvis, pain is constant with intermittent worsening.  Slightly relieved with APAP or Motrin.  Pt is s/p R ovarian cystectomy, partial oophorectomy 2wks ago, had been recovering well from surgery until last week.  No fever.  No n/v/d.  On exam uncomfortable appearing, nad, mmm, lungs clear, rrr, abd + RLQ ttp, no rebound or guarding, no rash, no edema, 2+ pulses, speech clear, no focal neuro deficits.   Consider ovarian torsion, plan for u/s, labs, UA.

## 2018-01-29 NOTE — ED ADULT NURSE NOTE - OBJECTIVE STATEMENT
A&Ox4, respirations even and unlabored, skin warm and dry good for color, ambulatory, guarding abdomen, appears uncomfortable. Patient reports uterine surgery 1/11/2018, states was "feeling fine" after surgery, abdominal pain and nausea began 3 days ago. Reports subjective fevers. Denies chest pain, SOB, LOC, recent falls. Left hand 20g IV placed, labs drawn and sent.

## 2018-01-29 NOTE — ED PROVIDER NOTE - PROGRESS NOTE DETAILS
Bentley att: 8PM patient signed out to me. Sono 6 x 3 cm  complex right focus. GYN to be consulted. Patient ordered for ct abd pelvis po and iv contrast for further evaluation. Gollogly: Discussed TVUS results with pt and plan for CT. Pt's pain improved with dilaudid but still moderate in intensity - will continue to monitor and will give additional pain meds PRN. Bentley att: Patient reports moderate pain, declines further pain meds at this time. 3.5-4.0 Hour wait time for CT d/w CT tech, patient 2nd to next, delay 2/2 several r/o aortic dissections. Patient notified of delay. Bentley att: 2 pages by me to OBGYN 9 and 930 pm, no answer. Resident paged twice as well. 23:38 Call back received and consult received by GYN. 1AM two page to OBGYN for eta, no call back. Patient requesting MD at 01:45. Patient crying. 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 2AM upon being moved from RW back to intake, patient's visitor became livid and demanded a manager. Third eval by me, GYN called on spectra to express MD difficulty reaching team and patient's frustration with delay to eval. Request stat consult. 02:15 Upon updating patient ETA 5 minutes patient crying heavily. 02:30 GYN att at bedside. Bentley att: 2 pages by me to OBGYN 9 and 930 pm, no answer. Resident paged twice as well. 23:38 Call back received and consult received by GYN. 1AM spoke to OBGYN twice for update on ETA, no ETA. Patient requesting MD at 01:45. Patient crying. 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 2AM upon being moved from RW back to intake, patient's visitor became livid and demanded a manager, MARTHA Hill made aware of situation. Third eval by me, GYN called on spectra to express MD difficulty reaching team and patient's frustration with delay to eval. Request stat consult. 02:15 Upon updating patient ETA 5 minutes patient crying heavily. 02:30 GYN att at bedside. 02:45 Patient signed out to MARTHA Álvarez aware of situation. Bentley att: 6PM patient signed out to me, rlq pain in for sono r/o torsion or cyst, if neg order CT. Patient pre-emptively written for gastroview to expedite CT if needed. 18:57 Sono 6 x 3 cm  complex right focus. GYN to be consulted. Patient ordered for ct abd pelvis po and iv contrast for further evaluation. Bentley att: 2 pages by me to OBGYN 9 and 930 pm, no answer. Resident paged twice as well. 23:38 Call back received and consult received by GYN. 1AM spoke to OBGYN twice for update on ETA, no ETA. Patient requesting MD at 01:45. Patient crying. 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 2AM upon being moved from RW back to intake, patient's visitor became livid and demanded a manager, MARTHA Hill made aware of situation. Third eval by me, GYN called on spectra to express MD difficulty reaching team and patient's frustration with delay to eval. Request stat consult. 02:15 Upon updating patient ETA 5 minutes patient crying heavily. 02:30 GYN att at bedside. 02:45 Patient signed out to MARTHA Álvarez aware of situation. PAYAL Day: Pt seen by OBGYN, can admit to Dr. Balderas. No abx at this time, pt to remain NPO.

## 2018-01-29 NOTE — ED PROVIDER NOTE - MEDICAL DECISION MAKING DETAILS
+ RLQ abd pain s/p gyn surgery.  Plan for pelvic u/s to eval ovarian torsion, labs, ua and pain control

## 2018-01-30 DIAGNOSIS — R10.31 RIGHT LOWER QUADRANT PAIN: ICD-10-CM

## 2018-01-30 DIAGNOSIS — R10.2 PELVIC AND PERINEAL PAIN: ICD-10-CM

## 2018-01-30 LAB
APTT BLD: 39.7 SEC — HIGH (ref 27.5–37.4)
INR BLD: 1.13 — SIGNIFICANT CHANGE UP (ref 0.88–1.17)
PROTHROM AB SERPL-ACNC: 12.6 SEC — SIGNIFICANT CHANGE UP (ref 9.8–13.1)

## 2018-01-30 PROCEDURE — 99221 1ST HOSP IP/OBS SF/LOW 40: CPT

## 2018-01-30 RX ORDER — SERTRALINE 25 MG/1
100 TABLET, FILM COATED ORAL ONCE
Qty: 0 | Refills: 0 | Status: COMPLETED | OUTPATIENT
Start: 2018-01-30 | End: 2018-01-30

## 2018-01-30 RX ORDER — HYDROMORPHONE HYDROCHLORIDE 2 MG/ML
1 INJECTION INTRAMUSCULAR; INTRAVENOUS; SUBCUTANEOUS EVERY 6 HOURS
Qty: 0 | Refills: 0 | Status: DISCONTINUED | OUTPATIENT
Start: 2018-01-30 | End: 2018-02-01

## 2018-01-30 RX ORDER — ONDANSETRON 8 MG/1
4 TABLET, FILM COATED ORAL ONCE
Qty: 0 | Refills: 0 | Status: COMPLETED | OUTPATIENT
Start: 2018-01-30 | End: 2018-01-30

## 2018-01-30 RX ORDER — ACETAMINOPHEN 500 MG
1000 TABLET ORAL ONCE
Qty: 0 | Refills: 0 | Status: COMPLETED | OUTPATIENT
Start: 2018-01-30 | End: 2018-01-30

## 2018-01-30 RX ORDER — ACETAMINOPHEN 500 MG
650 TABLET ORAL EVERY 6 HOURS
Qty: 0 | Refills: 0 | Status: DISCONTINUED | OUTPATIENT
Start: 2018-01-30 | End: 2018-02-02

## 2018-01-30 RX ORDER — HEPARIN SODIUM 5000 [USP'U]/ML
5000 INJECTION INTRAVENOUS; SUBCUTANEOUS EVERY 12 HOURS
Qty: 0 | Refills: 0 | Status: DISCONTINUED | OUTPATIENT
Start: 2018-01-30 | End: 2018-02-01

## 2018-01-30 RX ORDER — PIPERACILLIN AND TAZOBACTAM 4; .5 G/20ML; G/20ML
3.38 INJECTION, POWDER, LYOPHILIZED, FOR SOLUTION INTRAVENOUS EVERY 8 HOURS
Qty: 0 | Refills: 0 | Status: DISCONTINUED | OUTPATIENT
Start: 2018-01-30 | End: 2018-02-01

## 2018-01-30 RX ORDER — LEVOTHYROXINE SODIUM 125 MCG
75 TABLET ORAL DAILY
Qty: 0 | Refills: 0 | Status: DISCONTINUED | OUTPATIENT
Start: 2018-01-30 | End: 2018-02-02

## 2018-01-30 RX ORDER — HYDROMORPHONE HYDROCHLORIDE 2 MG/ML
1 INJECTION INTRAMUSCULAR; INTRAVENOUS; SUBCUTANEOUS ONCE
Qty: 0 | Refills: 0 | Status: DISCONTINUED | OUTPATIENT
Start: 2018-01-30 | End: 2018-01-30

## 2018-01-30 RX ORDER — ONDANSETRON 8 MG/1
4 TABLET, FILM COATED ORAL ONCE
Qty: 0 | Refills: 0 | Status: DISCONTINUED | OUTPATIENT
Start: 2018-01-30 | End: 2018-02-01

## 2018-01-30 RX ORDER — SODIUM CHLORIDE 9 MG/ML
1000 INJECTION, SOLUTION INTRAVENOUS
Qty: 0 | Refills: 0 | Status: DISCONTINUED | OUTPATIENT
Start: 2018-01-30 | End: 2018-01-31

## 2018-01-30 RX ORDER — HEPARIN SODIUM 5000 [USP'U]/ML
5000 INJECTION INTRAVENOUS; SUBCUTANEOUS EVERY 12 HOURS
Qty: 0 | Refills: 0 | Status: DISCONTINUED | OUTPATIENT
Start: 2018-01-30 | End: 2018-01-30

## 2018-01-30 RX ORDER — SODIUM CHLORIDE 9 MG/ML
1000 INJECTION, SOLUTION INTRAVENOUS
Qty: 0 | Refills: 0 | Status: DISCONTINUED | OUTPATIENT
Start: 2018-01-31 | End: 2018-02-02

## 2018-01-30 RX ORDER — ACETAMINOPHEN 500 MG
1000 TABLET ORAL ONCE
Qty: 0 | Refills: 0 | Status: COMPLETED | OUTPATIENT
Start: 2018-01-30 | End: 2018-12-29

## 2018-01-30 RX ORDER — SERTRALINE 25 MG/1
100 TABLET, FILM COATED ORAL DAILY
Qty: 0 | Refills: 0 | Status: DISCONTINUED | OUTPATIENT
Start: 2018-01-30 | End: 2018-02-01

## 2018-01-30 RX ADMIN — PIPERACILLIN AND TAZOBACTAM 25 GRAM(S): 4; .5 INJECTION, POWDER, LYOPHILIZED, FOR SOLUTION INTRAVENOUS at 22:16

## 2018-01-30 RX ADMIN — ONDANSETRON 4 MILLIGRAM(S): 8 TABLET, FILM COATED ORAL at 12:31

## 2018-01-30 RX ADMIN — SODIUM CHLORIDE 125 MILLILITER(S): 9 INJECTION, SOLUTION INTRAVENOUS at 07:17

## 2018-01-30 RX ADMIN — HYDROMORPHONE HYDROCHLORIDE 1 MILLIGRAM(S): 2 INJECTION INTRAMUSCULAR; INTRAVENOUS; SUBCUTANEOUS at 02:56

## 2018-01-30 RX ADMIN — SERTRALINE 100 MILLIGRAM(S): 25 TABLET, FILM COATED ORAL at 22:16

## 2018-01-30 RX ADMIN — Medication 400 MILLIGRAM(S): at 07:17

## 2018-01-30 RX ADMIN — Medication 400 MILLIGRAM(S): at 20:01

## 2018-01-30 RX ADMIN — Medication 1000 MILLIGRAM(S): at 07:32

## 2018-01-30 RX ADMIN — Medication 0.5 MILLIGRAM(S): at 20:01

## 2018-01-30 RX ADMIN — Medication 1000 MILLIGRAM(S): at 20:16

## 2018-01-30 RX ADMIN — PIPERACILLIN AND TAZOBACTAM 25 GRAM(S): 4; .5 INJECTION, POWDER, LYOPHILIZED, FOR SOLUTION INTRAVENOUS at 15:29

## 2018-01-30 RX ADMIN — HYDROMORPHONE HYDROCHLORIDE 1 MILLIGRAM(S): 2 INJECTION INTRAMUSCULAR; INTRAVENOUS; SUBCUTANEOUS at 03:11

## 2018-01-30 RX ADMIN — ONDANSETRON 4 MILLIGRAM(S): 8 TABLET, FILM COATED ORAL at 20:01

## 2018-01-30 RX ADMIN — HYDROMORPHONE HYDROCHLORIDE 1 MILLIGRAM(S): 2 INJECTION INTRAMUSCULAR; INTRAVENOUS; SUBCUTANEOUS at 08:39

## 2018-01-30 RX ADMIN — HEPARIN SODIUM 5000 UNIT(S): 5000 INJECTION INTRAVENOUS; SUBCUTANEOUS at 17:33

## 2018-01-30 RX ADMIN — ONDANSETRON 4 MILLIGRAM(S): 8 TABLET, FILM COATED ORAL at 03:11

## 2018-01-30 RX ADMIN — HYDROMORPHONE HYDROCHLORIDE 1 MILLIGRAM(S): 2 INJECTION INTRAMUSCULAR; INTRAVENOUS; SUBCUTANEOUS at 09:20

## 2018-01-30 RX ADMIN — SERTRALINE 100 MILLIGRAM(S): 25 TABLET, FILM COATED ORAL at 21:03

## 2018-01-30 NOTE — CHART NOTE - NSCHARTNOTEFT_GEN_A_CORE
Consulted Interventional Radiology for possible drainage of collection.    They said collection is interloop fluid and will be difficult to access. Given patients current status, afebrile and no WBC, they say the risks of procedure outweigh the benefit and do not believe IR procedure is necessary at this time.    D/W Dr. Madison Freedman, PGY2 Consulted Interventional Radiology for possible drainage of collection.    Per ID collection is interlooping fluid and will be difficult to access. Given patient's current non-infectious status, afebrile and no WBC, state risks of procedure outweigh the benefits and do not believe IR procedure is necessary at this time.    D/W Dr. Madison Freedman, PGY2 Consulted Interventional Radiology for possible drainage of collection.    Per IR collection is interlooping fluid and will be difficult to access. Given patient's current non-infectious status, afebrile and no WBC, state risks of procedure outweigh the benefits and do not believe IR procedure is necessary at this time.    D/W Dr. Madison Freedman, PGY2

## 2018-01-30 NOTE — H&P ADULT - ASSESSMENT
46yo  with endometriosis, POD#19 s/p laparoscopic right ovarian cystectomy, partial salpingectomy, partial oophorectomy () with pelvic pain and intra-abdominal collection on imaging suspicious for abscess.

## 2018-01-30 NOTE — PROGRESS NOTE ADULT - ASSESSMENT
Pt is a 46 yo POD# 19 s/p C R ovarian cystectomy, partial R oophorectomy who presented last night with R lower abdominal pain and found to have an intraabdominal fluid collection on CT. Pt has been afebrile. Pt is still experiencing abd pain, but it is being controlled with the current pain regiment. Pt is currently NPO.

## 2018-01-30 NOTE — PATIENT PROFILE ADULT. - VISION (WITH CORRECTIVE LENSES IF THE PATIENT USUALLY WEARS THEM):
Normal vision: sees adequately in most situations; can see medication labels, newsprint
04-Oct-2017 13:54

## 2018-01-30 NOTE — H&P ADULT - PROBLEM SELECTOR PLAN 1
Patient currently without clinical signs of infection - afebrile, hemodynamically stable, no leukocytosis; however, significant tenderness on exam. UA negative. Ddx includes pelvic abscess vs PID vs endometriosis pain  - Admit to GYN  - Tylenol, dilaudid prn for analgesia  - IR consult to eval for drainage  - pt remains NPO    D/w Dr. Sherrie Badillo, PGY2

## 2018-01-30 NOTE — CONSULT NOTE ADULT - ATTENDING COMMENTS
I have reviewed the history, pertinent labs and imaging, and discussed the care with the consult resident.    The active issues are:  1. pelvic collection, possible postoperative abscess    Plan  would attempt nonoperative management with antibiotics alone at this time, as the patient does not have evidence of systemic illness - no fever, normal WBC  if no improvement with antibiotics, would re-image after a few days to reevaluate collection size, and whether it is amenable to IR drainage  if persistent failure to improve after trial of antibiotics and repeat imaging does not reveal a collection amenable to IR, can consider surgical drainage, but would consider this a last resort given risk of dense adhesions at this relatively early postop (~19days) period would put patient at risk for inadvertent enterotomy  if any acute change in clinical status, please notify surgery team

## 2018-01-30 NOTE — PROGRESS NOTE ADULT - ATTENDING COMMENTS
Pt seen and examined and agree with above  IR does not believe collection is amenable to drainage  - interloop fluid  Surgery does not believe surgical intervention is needed at this time  Will continue current management, continue to monitor

## 2018-01-30 NOTE — CONSULT NOTE ADULT - SUBJECTIVE AND OBJECTIVE BOX
CC: 47 year old Female admitted with a chief complaint of pelvic pain (2018 03:00), with CT evidence of pelvic fluid collection vs abscess    HPI: 48 yo F presenting with 5 days of pelvic pain. Patient underwent laparoscopic ovarian cystectomy with partial oophorectomy/salpingectomy for large endometrial cyst.    PMHx: asthma, hypothyroidism, GERD, MVP, multiple sinus infections, migraines, depression, anxiety, anemia, menorrhagia, thyroid nodule, prior pneumonia, multiple environmental allergies.      PSHx: L breast lumpectomy (benign), laparoscopic cholecystectomy (), appendectomy (), dilation and curettage, uterine ablation, septoplasty/sinus surgery, cone biopsy of cervix    Medications (inpatient): lactated ringers. 1000 milliLiter(s) IV Continuous <Continuous>  levothyroxine 75 MICROGram(s) Oral daily  ondansetron Injectable 4 milliGRAM(s) IV Push once  piperacillin/tazobactam IVPB. 3.375 Gram(s) IV Intermittent every 8 hours  sertraline 100 milliGRAM(s) Oral daily    Medications (PRN):ondansetron Injectable 4 milliGRAM(s) IV Push once PRN    Allergies: penicillin (Other)  Red and Green Pepper  tongue rash (Other)  (Intolerances: )  Social Hx:   Family Hx:    Physical Exam  T(C): 36.9 (18 @ 09:33)  HR: 80 (18 @ 09:33) (70 - 100)  BP: 106/67 (18 @ 09:33) (106/67 - 121/82)  RR: 18 (18 @ 09:33) (18 - 20)  SpO2: 97% (18 @ 09:33) (95% - 100%)  Wt(kg): --  Tmax: T(C): , Max: 37.4 (18 @ 14:48)  Wt(kg): --    General: well developed, well nourished, NAD  Neuro: alert and oriented, no focal deficits, moves all extremities spontaneously  HEENT: NCAT, EOMI, anicteric, mucosa moist  Respiratory: airway patent, respirations unlabored  CVS: regular rate and rhythm  Abdomen: soft, nontender, nondistended  Extremities: no edema, sensation and movement grossly intact  Skin: warm, dry, appropriate color    Labs:                        12.4   5.94  )-----------( 248      ( 2018 18:45 )             38.7     PT/INR - ( 2018 06:46 )   PT: 12.6 SEC;   INR: 1.13          PTT - ( 2018 06:46 )  PTT:39.7 SEC      140  |  102  |  8   ----------------------------<  83  Test not performed SPECIMEN GROSSLY HEMOLYZED   |  22  |  0.72    Ca    8.9      2018 17:20    TPro  7.6  /  Alb  3.9  /  TBili  0.4  /  DBili  x   /  AST  41<H>  /  ALT  32  /  AlkPhos  64      Urinalysis Basic - ( 2018 23:00 )    Color: YELLOW / Appearance: CLEAR / S.019 / pH: 6.0  Gluc: NEGATIVE / Ketone: NEGATIVE  / Bili: NEGATIVE / Urobili: NORMAL mg/dL   Blood: NEGATIVE / Protein: NEGATIVE mg/dL / Nitrite: NEGATIVE   Leuk Esterase: TRACE / RBC: 2-5 / WBC 2-5   Sq Epi: OCC / Non Sq Epi: x / Bacteria: FEW            Imaging and other studies: CC: 47 year old Female admitted with a chief complaint of pelvic pain (2018 03:00), with CT evidence of pelvic fluid collection vs abscess    HPI: 46 yo F presenting with 5 days of pelvic pain. Patient underwent laparoscopic ovarian cystectomy with partial oophorectomy/salpingectomy for large endometrial cyst. Patient states she had abdominal muscle soreness postoperatively, which improved, but 5 days ago she had worsening abdominal soreness and the same type of pressure/pain in the pelvis she had prior to surgery. She had been tolerating a regular diet but only had billy crackers yesterday, nothing today, and was mildly nauseated, would like to be able to eat or drink. Had some episodes of diarrhea followed by hard stools, and rectal pain that was improved with defecation. States she had some of these symptoms usually but that the amount of diarrhea was larger since surgery. Patient was initially advised to present to ED when the symptoms started, but she has reactive airway disease/asthma, and was concerned about getting something while waiting in the ED, so she deferred presentation to the ED until it seemed the pain was not improving.    In the ED she had a transvaginal sono and a CT scan that showed some fluid collection, associated with likely reactive terminal ileum thickening. IR was consulted but per report, did not feel it was appropriate to drain the collection as it was interloop fluid, and without systemic evidence of infection (no leukocytosis, afebrile).    PMHx: asthma, hypothyroidism, GERD, MVP, multiple sinus infections, migraines, depression, anxiety, anemia, menorrhagia, thyroid nodule, prior pneumonia, multiple environmental allergies.    PSHx: L breast lumpectomy (benign), laparoscopic cholecystectomy (), appendectomy (), dilation and curettage, uterine ablation, septoplasty/sinus surgery, cone biopsy of cervix    Medications (inpatient): lactated ringers. 1000 milliLiter(s) IV Continuous <Continuous>  levothyroxine 75 MICROGram(s) Oral daily  ondansetron Injectable 4 milliGRAM(s) IV Push once  piperacillin/tazobactam IVPB. 3.375 Gram(s) IV Intermittent every 8 hours  sertraline 100 milliGRAM(s) Oral daily    Medications (PRN):ondansetron Injectable 4 milliGRAM(s) IV Push once PRN    Allergies: penicillin (Other), Red and Green Pepper (tongue rash)  Social Hx: Never smoker, denies illicit drug use, occasional EtOH (weddings and special events), lives at home with her family.    Physical Exam  T(C): 36.9 (18 @ 09:33)  HR: 80 (18 @ 09:33) (70 - 100)  BP: 106/67 (18 @ 09:33) (106/67 - 121/82)  RR: 18 (18 @ 09:33) (18 - 20)  SpO2: 97% (18 @ 09:33) (95% - 100%)  Tmax: T(C): , Max: 37.4 (18 @ 14:48)    General: well developed, well nourished, NAD  Neuro: alert and oriented, no focal deficits, moves all extremities spontaneously  HEENT: NCAT, EOMI, anicteric, mucosa moist  Respiratory: airway patent, respirations unlabored  CVS: regular rate and rhythm  Abdomen: softly distended, tender in suprapubic region, with no rebound, minimal tenderness in RLQ, nontender in LLQ  Extremities: no edema, sensation and movement grossly intact  Skin: warm, dry, appropriate color    Labs:                        12.4   5.94  )-----------( 248      ( 2018 18:45 )             38.7     PT/INR - ( 2018 06:46 )   PT: 12.6 SEC;   INR: 1.13     PTT - ( 2018 06:46 )  PTT:39.7 SEC        140  |  102  |  8   ----------------------------<  83  (hemolyzed)   |  22  |  0.72    Ca    8.9      2018 17:20    TPro  7.6  /  Alb  3.9  /  TBili  0.4  /  DBili  x   /  AST  41<H>  /  ALT  32  /  AlkPhos  64      Urinalysis Basic - ( 2018 23:00 )  Color: YELLOW / Appearance: CLEAR / S.019 / pH: 6.0  Gluc: NEGATIVE / Ketone: NEGATIVE  / Bili: NEGATIVE / Urobili: NORMAL mg/dL   Blood: NEGATIVE / Protein: NEGATIVE mg/dL / Nitrite: NEGATIVE   Leuk Esterase: TRACE / RBC: 2-5 / WBC 2-5   Sq Epi: OCC / Non Sq Epi: x / Bacteria: FEW    Imaging and other studies:  < from: CT Abdomen and Pelvis w/ Oral Cont and w/ IV Cont (18 @ 23:13) >  Loculated peripherally enhancing fluid collections within the pelvis in the region of the right adnexa and cul-de-sac due to hydrosalpinx/pyosalpinx, seroma, abscess, endometrioma, or peritoneal inclusion cyst. Adjacent thickened loops of bowel are likely reactive.   Continued follow-up to resolution.    < end of copied text >    < from: US Transvaginal (18 @ 18:04) >  Nonvisualization of a normal right ovary.  A 6 x 3 cm complex right focus without evidence of internal vascularity on color Doppler imaging.  Ovarian torsion cannot be excluded based on this examination.  Adjacent hydrosalpinx or loculated fluid is demonstrated.    < end of copied text >

## 2018-01-30 NOTE — PROGRESS NOTE ADULT - SUBJECTIVE AND OBJECTIVE BOX
GYN Progress Note  HD#1     Patient seen and examined at bedside. Pt still having abdominal pain that she states is a little better with the pain medications. No acute overnight events.   Patient is passing flatus and voiding spontaneously. Pt is NPO for possible IR procedure later today. Pt is nauseous. No vomiting.  Denies CP, SOB, fevers, and chills.    Vital Signs Last 24 Hours  T(C): 37.1 (18 @ 05:34), Max: 37.4 (18 @ 14:48)  HR: 70 (18 @ 05:34) (70 - 100)  BP: 115/71 (18 @ 05:34) (115/71 - 121/82)  RR: 20 (18 @ 05:34) (18 - 20)  SpO2: 95% (18 @ 05:34) (95% - 100%)    I&O's Summary      Physical Exam:  General: NAD  CV: NR, RR, S1, S2, no M/R/G  Lungs: CTA-B  Abdomen: Soft, tender to palpation suprapubic and RLQ. Minimal amount of guarding.  No rebound. Non-distended, +BS  Incision: healed lap site incisions  Ext: No pain or swelling    Labs:                        12.4   5.94  )-----------( 248      ( 2018 18:45 )             38.7   baso 0.3    eos 1.9    imm gran 0.2    lymph 21.7   mono 5.6    poly 70.3         140  |  102  |  8   ----------------------------<  83  Test not performed SPECIMEN GROSSLY HEMOLYZED   |  22  |  0.72    Ca    8.9      2018 17:20    TPro  7.6  /  Alb  3.9  /  TBili  0.4  /  DBili  x   /  AST  41<H>  /  ALT  32  /  AlkPhos  64        Urinalysis Basic - ( 2018 23:00 )    Color: YELLOW / Appearance: CLEAR / S.019 / pH: 6.0  Gluc: NEGATIVE / Ketone: NEGATIVE  / Bili: NEGATIVE / Urobili: NORMAL mg/dL   Blood: NEGATIVE / Protein: NEGATIVE mg/dL / Nitrite: NEGATIVE   Leuk Esterase: TRACE / RBC: 2-5 / WBC 2-5   Sq Epi: OCC / Non Sq Epi: x / Bacteria: FEW        Blood Type: O Positive      MEDICATIONS  (STANDING):  acetaminophen  IVPB. 1000 milliGRAM(s) IV Intermittent once  lactated ringers. 1000 milliLiter(s) (125 mL/Hr) IV Continuous <Continuous>  levothyroxine 75 MICROGram(s) Oral daily    MEDICATIONS  (PRN):  ondansetron Injectable 4 milliGRAM(s) IV Push once PRN Nausea and/or Vomiting

## 2018-01-30 NOTE — H&P ADULT - HISTORY OF PRESENT ILLNESS
HPI:    46yo  perimenopausal with endometriosis, POD#19 s/p laparoscopic right ovarian cystectomy, partial salpingectomy, partial oophorectomy () presenting with worsening pelvic pain x 3days. Patient reports acute onset of severe lower abdominal cramps 4 nights ago, worse on right side. She denies aggravating or alleviating factors, stating that Tylenol barely "Takes the edge off." Denies nausea/vomiting at home but is starting to feel nauseous in ED. Denies dysuria, polyuria, abnormal vaginal discharge or itching. She is tolerating regular diet, ambulating without assistance at home. No fevers, chest pain, SOB.  Pt is requiring dilaudid in ED    Path () c/w endometriotic cyst, and pseudoxanthomatous salpingitis      OBHx:  x2, SAB x1  GynHx: s/p endometrial ablation for HMB, endometriosis, abnl Pap s/p cone biopsy  PAST MEDICAL & SURGICAL HISTORY:  Pneumonia:   Environmental allergies  Sinus infection: completed antibiotic -- clindamyacin today, 17  Migraines  Hypothyroid  Ovarian cyst: Dec. 2017  Right thyroid nodule  GERD (gastroesophageal reflux disease)  Excessive menstruation  MVP (mitral valve prolapse)  Anemia  Depression with anxiety  Asthma  Enlarged lymph nodes in armpit: excision of left benign lymph node of axilla region  Breast mass, left: lumpectomy  Pain: sinus surgery  S/P laparoscopic cholecystectomy '06  S/P dilatation and curettage: miscarriage &#x27;06  S/P appendectomy: &#x27;04  H/O cone biopsy of cervix: 20 yrs ago  Allergies  penicillin (Other)  Red and Green Pepper  tongue rash (Other)  Intolerances    SH: no h/o cigarette smoking, occasional EtOH    Vital Signs Last 24 Hrs  T(C): 37.1 (2018 05:34), Max: 37.4 (2018 14:48)  T(F): 98.8 (2018 05:34), Max: 99.4 (2018 14:48)  HR: 70 (2018 05:34) (70 - 100)  BP: 115/71 (2018 05:34) (115/71 - 121/82)  RR: 20 (2018 05:34) (18 - 20)  SpO2: 95% (2018 05:34) (95% - 100%)    PE:  Gen: uncomfortable from pain  CV: RRR  Pulm: CTAB  Abd: normoactive bowel sounds, soft, TTP in RLQ, +guarding, no rebound tenderness  Ext: No edema or tenderness bilaterally  Bimanual: +right adnexal tenderness, no CMT, no notable discharge, no bleeding    LABS:                        12.4   5.94  )-----------( 248      ( 2018 18:45 )             38.7         140  |  102  |  8   ----------------------------<  83  Test not performed SPECIMEN GROSSLY HEMOLYZED   |  22  |  0.72    Ca    8.9      2018 17:20    TPro  7.6  /  Alb  3.9  /  TBili  0.4  /  DBili  x   /  AST  41<H>  /  ALT  32  /  AlkPhos  64        Urinalysis Basic - ( 2018 23:00 )    Color: YELLOW / Appearance: CLEAR / S.019 / pH: 6.0  Gluc: NEGATIVE / Ketone: NEGATIVE  / Bili: NEGATIVE / Urobili: NORMAL mg/dL   Blood: NEGATIVE / Protein: NEGATIVE mg/dL / Nitrite: NEGATIVE   Leuk Esterase: TRACE / RBC: 2-5 / WBC 2-5   Sq Epi: OCC / Non Sq Epi: x / Bacteria: FEW        RADIOLOGY & ADDITIONAL STUDIES:  < from: US Transvaginal (18 @ 18:04) >  IMPRESSION:    Nonvisualization of a normal right ovary.  A 6 x 3 cm complex right focus without evidence of internal vascularity   on color Doppler imaging.  Ovarian torsion cannot be excluded based on this examination.  Adjacent hydrosalpinx or loculated fluid is demonstrated.      < from: CT Abdomen and Pelvis w/ Oral Cont and w/ IV Cont (18 @ 23:13) >  FINDINGS:    LOWER CHEST: Within normal limits.    LIVER: Within normal limits.  BILE DUCTS: Mild intrahepatic bile duct dilatation secondary to   postcholecystectomy state.  GALLBLADDER:Cholecystectomy.    SPLEEN: Within normal limits.  PANCREAS: Within normal limits.  ADRENALS: Within normal limits.  KIDNEYS/URETERS: Within normal limits.    BLADDER: Within normal limits.  REPRODUCTIVE ORGANS: Thickened, heterogeneous endometrium measuring 9 mm.   Tubular structure within the right adnexa which may represent   hydrosalpinx versus loculated fluid.    BOWEL: No bowel obstruction. Status post appendectomy.  PERITONEUM: Loculated peripherally enhancing fluid collection measuring   5.6 x 3.6 cm which surrounds thickened loops of ileum within the pelvis.   Additional loculated fluid collection within the cul-de-sac measuring 1.1   x 1.4 cm.  VESSELS:  Retroaortic left renal vein.    ABDOMINAL WALL: Within normal limits.  BONES: Within normal limits.    IMPRESSION:     Loculated peripherally enhancing fluid collections within the pelvis in   the region of the right adnexa and cul-de-sac due to   hydrosalpinx/pyosalpinx, seroma, abscess, endometrioma, or peritoneal   inclusion cyst. Adjacent thickened loops of bowel are likely reactive.

## 2018-01-30 NOTE — PROGRESS NOTE ADULT - SUBJECTIVE AND OBJECTIVE BOX
Interventional Radiology:    Consultation for the evaluation and drainage of a 5.6 x 3.6cm fluid collection that was seen in the pelvis on a recent CT was requested.  After consultation with Dr. Wallace the fluid that surrounded the distal small intestine was not definitely infected and the technical limitations of this location being draped on all sides by bowel would make the risk of the procedure outweigh the benefits at this time.  However should the patients condition change please do no hesitate to call Interventional Radiology. Interventional Radiology:    Consultation for the evaluation and drainage of a 5.6 x 3.6cm fluid collection that was seen in the pelvis on a recent CT was requested. The imaging was reviewed. After consultation with Dr. Wallace the fluid surrounds the distal small intestine. Percutaneous acces would be technically difficult. The risk of the procedure is felt to outweigh the benefits at this time.  However should the patients condition change please do no hesitate to call Interventional Radiology.

## 2018-01-31 DIAGNOSIS — R69 ILLNESS, UNSPECIFIED: ICD-10-CM

## 2018-01-31 LAB
BACTERIA UR CULT: SIGNIFICANT CHANGE UP
BASOPHILS # BLD AUTO: 0.02 K/UL — SIGNIFICANT CHANGE UP (ref 0–0.2)
BASOPHILS NFR BLD AUTO: 0.4 % — SIGNIFICANT CHANGE UP (ref 0–2)
EOSINOPHIL # BLD AUTO: 0.08 K/UL — SIGNIFICANT CHANGE UP (ref 0–0.5)
EOSINOPHIL NFR BLD AUTO: 1.7 % — SIGNIFICANT CHANGE UP (ref 0–6)
HCT VFR BLD CALC: 34 % — LOW (ref 34.5–45)
HGB BLD-MCNC: 11.4 G/DL — LOW (ref 11.5–15.5)
IMM GRANULOCYTES # BLD AUTO: 0.01 # — SIGNIFICANT CHANGE UP
IMM GRANULOCYTES NFR BLD AUTO: 0.2 % — SIGNIFICANT CHANGE UP (ref 0–1.5)
LYMPHOCYTES # BLD AUTO: 0.92 K/UL — LOW (ref 1–3.3)
LYMPHOCYTES # BLD AUTO: 19.5 % — SIGNIFICANT CHANGE UP (ref 13–44)
MCHC RBC-ENTMCNC: 27.9 PG — SIGNIFICANT CHANGE UP (ref 27–34)
MCHC RBC-ENTMCNC: 33.5 % — SIGNIFICANT CHANGE UP (ref 32–36)
MCV RBC AUTO: 83.3 FL — SIGNIFICANT CHANGE UP (ref 80–100)
MONOCYTES # BLD AUTO: 0.37 K/UL — SIGNIFICANT CHANGE UP (ref 0–0.9)
MONOCYTES NFR BLD AUTO: 7.8 % — SIGNIFICANT CHANGE UP (ref 2–14)
NEUTROPHILS # BLD AUTO: 3.33 K/UL — SIGNIFICANT CHANGE UP (ref 1.8–7.4)
NEUTROPHILS NFR BLD AUTO: 70.4 % — SIGNIFICANT CHANGE UP (ref 43–77)
NRBC # FLD: 0 — SIGNIFICANT CHANGE UP
PLATELET # BLD AUTO: 218 K/UL — SIGNIFICANT CHANGE UP (ref 150–400)
PMV BLD: 10.1 FL — SIGNIFICANT CHANGE UP (ref 7–13)
RBC # BLD: 4.08 M/UL — SIGNIFICANT CHANGE UP (ref 3.8–5.2)
RBC # FLD: 13.2 % — SIGNIFICANT CHANGE UP (ref 10.3–14.5)
SPECIMEN SOURCE: SIGNIFICANT CHANGE UP
WBC # BLD: 4.73 K/UL — SIGNIFICANT CHANGE UP (ref 3.8–10.5)
WBC # FLD AUTO: 4.73 K/UL — SIGNIFICANT CHANGE UP (ref 3.8–10.5)

## 2018-01-31 PROCEDURE — 99231 SBSQ HOSP IP/OBS SF/LOW 25: CPT

## 2018-01-31 RX ORDER — OXYCODONE HYDROCHLORIDE 5 MG/1
5 TABLET ORAL EVERY 4 HOURS
Qty: 0 | Refills: 0 | Status: DISCONTINUED | OUTPATIENT
Start: 2018-01-31 | End: 2018-02-02

## 2018-01-31 RX ORDER — ONDANSETRON 8 MG/1
4 TABLET, FILM COATED ORAL ONCE
Qty: 0 | Refills: 0 | Status: COMPLETED | OUTPATIENT
Start: 2018-01-31 | End: 2018-01-31

## 2018-01-31 RX ORDER — ACETAMINOPHEN 500 MG
1000 TABLET ORAL ONCE
Qty: 0 | Refills: 0 | Status: COMPLETED | OUTPATIENT
Start: 2018-01-31 | End: 2018-01-31

## 2018-01-31 RX ORDER — IBUPROFEN 200 MG
600 TABLET ORAL EVERY 6 HOURS
Qty: 0 | Refills: 0 | Status: DISCONTINUED | OUTPATIENT
Start: 2018-01-31 | End: 2018-02-02

## 2018-01-31 RX ADMIN — ONDANSETRON 4 MILLIGRAM(S): 8 TABLET, FILM COATED ORAL at 21:13

## 2018-01-31 RX ADMIN — PIPERACILLIN AND TAZOBACTAM 25 GRAM(S): 4; .5 INJECTION, POWDER, LYOPHILIZED, FOR SOLUTION INTRAVENOUS at 21:41

## 2018-01-31 RX ADMIN — PIPERACILLIN AND TAZOBACTAM 25 GRAM(S): 4; .5 INJECTION, POWDER, LYOPHILIZED, FOR SOLUTION INTRAVENOUS at 06:57

## 2018-01-31 RX ADMIN — OXYCODONE HYDROCHLORIDE 5 MILLIGRAM(S): 5 TABLET ORAL at 09:20

## 2018-01-31 RX ADMIN — HEPARIN SODIUM 5000 UNIT(S): 5000 INJECTION INTRAVENOUS; SUBCUTANEOUS at 16:25

## 2018-01-31 RX ADMIN — Medication 75 MICROGRAM(S): at 06:57

## 2018-01-31 RX ADMIN — Medication 400 MILLIGRAM(S): at 03:21

## 2018-01-31 RX ADMIN — HYDROMORPHONE HYDROCHLORIDE 1 MILLIGRAM(S): 2 INJECTION INTRAMUSCULAR; INTRAVENOUS; SUBCUTANEOUS at 21:13

## 2018-01-31 RX ADMIN — HYDROMORPHONE HYDROCHLORIDE 1 MILLIGRAM(S): 2 INJECTION INTRAMUSCULAR; INTRAVENOUS; SUBCUTANEOUS at 21:42

## 2018-01-31 RX ADMIN — ONDANSETRON 4 MILLIGRAM(S): 8 TABLET, FILM COATED ORAL at 08:39

## 2018-01-31 RX ADMIN — PIPERACILLIN AND TAZOBACTAM 25 GRAM(S): 4; .5 INJECTION, POWDER, LYOPHILIZED, FOR SOLUTION INTRAVENOUS at 15:14

## 2018-01-31 RX ADMIN — Medication 0.5 MILLIGRAM(S): at 21:13

## 2018-01-31 RX ADMIN — SERTRALINE 100 MILLIGRAM(S): 25 TABLET, FILM COATED ORAL at 21:13

## 2018-01-31 RX ADMIN — Medication 600 MILLIGRAM(S): at 16:25

## 2018-01-31 RX ADMIN — OXYCODONE HYDROCHLORIDE 5 MILLIGRAM(S): 5 TABLET ORAL at 09:55

## 2018-01-31 NOTE — PROGRESS NOTE ADULT - ASSESSMENT
Pt is a 48 yo POD# 20 s/p LSC R ovarian cystectomy, partial R oophorectomy who presented 2 nights ago th R lower abdominal pain and found to have an intraabdominal fluid collection on CT. IR was consulted and at this time there is no intervention, as fluid is an interloop fluid collection.  Pt has been afebrile. Pt is still experiencing abd pain, but she states that it is slightly improved with current pain regiment. Pt had subjective fever/chills overnight. Pt is NPO for potential diagnostic lap later today.

## 2018-01-31 NOTE — PROGRESS NOTE ADULT - SUBJECTIVE AND OBJECTIVE BOX
GYN Progress Note  POD#20   HD#2    Patient seen and examined at bedside, no acute overnight events. No acute complaints. Pt still having pain in the RLQ but states that it is a little better from yesterday.   Patient is ambulating, passing flatus, voiding spontaneously. She is NPO since midnight.  Denies CP, SOB. She has been afebrile overnight but having subjective fevers and chills. Some nausea. No vomiting.    Vital Signs Last 24 Hours  T(C): 36.9 (18 @ 01:57), Max: 37.5 (18 @ 17:37)  HR: 74 (18 @ 01:57) (74 - 93)  BP: 97/55 (18 @ 01:57) (97/55 - 115/67)  RR: 18 (18 @ 01:57) (18 - 19)  SpO2: 100% (18 @ 01:57) (97% - 100%)    I&O's Summary    2018 07:01  -  2018 06:26  --------------------------------------------------------  IN: 0 mL / OUT: 1350 mL / NET: -1350 mL        Physical Exam:  General: NAD  CV: NR, RR, S1, S2, no M/R/G  Lungs: CTA-B  Abdomen: Soft, tender to palpation in the RLQ, non-distended, minimal amount of guarding, no rebound,  +BS  Ext: No pain or swelling    Labs:                        11.4   4.73  )-----------( 218      ( 2018 04:26 )             34.0   baso 0.4    eos 1.7    imm gran 0.2    lymph 19.5   mono 7.8    poly 70.4                         12.4   5.94  )-----------( 248      ( 2018 18:45 )             38.7   baso 0.3    eos 1.9    imm gran 0.2    lymph 21.7   mono 5.6    poly 70.3         140  |  102  |  8   ----------------------------<  83  Test not performed SPECIMEN GROSSLY HEMOLYZED   |  22  |  0.72    Ca    8.9      2018 17:20    TPro  7.6  /  Alb  3.9  /  TBili  0.4  /  DBili  x   /  AST  41<H>  /  ALT  32  /  AlkPhos  64  01-29    PT/INR - ( 2018 06:46 )   PT: 12.6 SEC;   INR: 1.13          PTT - ( 2018 06:46 )  PTT:39.7 SEC  Urinalysis Basic - ( 2018 23:00 )    Color: YELLOW / Appearance: CLEAR / S.019 / pH: 6.0  Gluc: NEGATIVE / Ketone: NEGATIVE  / Bili: NEGATIVE / Urobili: NORMAL mg/dL   Blood: NEGATIVE / Protein: NEGATIVE mg/dL / Nitrite: NEGATIVE   Leuk Esterase: TRACE / RBC: 2-5 / WBC 2-5   Sq Epi: OCC / Non Sq Epi: x / Bacteria: FEW        Blood Type: O Positive      MEDICATIONS  (STANDING):  heparin  Injectable 5000 Unit(s) SubCutaneous every 12 hours  lactated ringers. 1000 milliLiter(s) (125 mL/Hr) IV Continuous <Continuous>  lactated ringers. 1000 milliLiter(s) (125 mL/Hr) IV Continuous <Continuous>  levothyroxine 75 MICROGram(s) Oral daily  piperacillin/tazobactam IVPB. 3.375 Gram(s) IV Intermittent every 8 hours  sertraline 100 milliGRAM(s) Oral daily    MEDICATIONS  (PRN):  acetaminophen   Tablet. 650 milliGRAM(s) Oral every 6 hours PRN Mild Pain (1 - 3)  HYDROmorphone  Injectable 1 milliGRAM(s) IV Push every 6 hours PRN Severe Pain (7 - 10)  LORazepam     Tablet 0.5 milliGRAM(s) Oral at bedtime PRN Anxiety  ondansetron Injectable 4 milliGRAM(s) IV Push once PRN Nausea and/or Vomiting

## 2018-01-31 NOTE — PROGRESS NOTE ADULT - SUBJECTIVE AND OBJECTIVE BOX
General Surgery Progress Note  B Team [id number]    Subjective & interval events:  -patient reports pain unchanged this am  -subjective fevers & diaphoresis overnight      Objective:  Vital Signs Last 24 Hrs  T(C): 36.9 (31 Jan 2018 01:57), Max: 37.5 (30 Jan 2018 17:37)  T(F): 98.4 (31 Jan 2018 01:57), Max: 99.5 (30 Jan 2018 17:37)  HR: 74 (31 Jan 2018 01:57) (74 - 93)  BP: 97/55 (31 Jan 2018 01:57) (97/55 - 115/67)  BP(mean): --  RR: 18 (31 Jan 2018 01:57) (18 - 19)  SpO2: 100% (31 Jan 2018 01:57) (97% - 100%)      I&O's Summary    30 Jan 2018 07:01  -  31 Jan 2018 06:40  --------------------------------------------------------  IN: 0 mL / OUT: 1350 mL / NET: -1350 mL      I&O's Detail    30 Jan 2018 07:01  -  31 Jan 2018 06:40  --------------------------------------------------------  IN:  Total IN: 0 mL    OUT:    Emesis: 100 mL    Voided: 1250 mL  Total OUT: 1350 mL    Total NET: -1350 mL      PE:  Gen: NAD, asleep on entering  Pulm: breathing comfortably on RA  CV: regular  Abd: soft, nondistended, tender RLQ; trocar site w/ dermabond open to air, no drainage/erythema      MEDICATIONS  (STANDING):  heparin  Injectable 5000 Unit(s) SubCutaneous every 12 hours  lactated ringers. 1000 milliLiter(s) (125 mL/Hr) IV Continuous <Continuous>  levothyroxine 75 MICROGram(s) Oral daily  piperacillin/tazobactam IVPB. 3.375 Gram(s) IV Intermittent every 8 hours  sertraline 100 milliGRAM(s) Oral daily    MEDICATIONS  (PRN):  acetaminophen   Tablet. 650 milliGRAM(s) Oral every 6 hours PRN Mild Pain (1 - 3)  HYDROmorphone  Injectable 1 milliGRAM(s) IV Push every 6 hours PRN Severe Pain (7 - 10)  LORazepam     Tablet 0.5 milliGRAM(s) Oral at bedtime PRN Anxiety  ondansetron Injectable 4 milliGRAM(s) IV Push once PRN Nausea and/or Vomiting                            11.4   4.73  )-----------( 218      ( 31 Jan 2018 04:26 )             34.0   01-29    140  |  102  |  8   ----------------------------<  83  Test not performed SPECIMEN GROSSLY HEMOLYZED   |  22  |  0.72    Ca    8.9      29 Jan 2018 17:20    TPro  7.6  /  Alb  3.9  /  TBili  0.4  /  DBili  x   /  AST  41<H>  /  ALT  32  /  AlkPhos  64  01-29      PT/INR - ( 30 Jan 2018 06:46 )   PT: 12.6 SEC;   INR: 1.13     PTT - ( 30 Jan 2018 06:46 )  PTT:39.7 SEC    Type + Screen (01.11.18 @ 12:11)    ABO Interpretation: O    Rh Interpretation: Positive    Antibody Screen: Negative      Imaging:  CT Abdomen and Pelvis w/ Oral Cont and w/ IV Cont (01.29.18 @ 23:13) >  IMPRESSION:     Loculated peripherally enhancing fluid collections within the pelvis in   the region of the right adnexa and cul-de-sac due to   hydrosalpinx/pyosalpinx, seroma, abscess, endometrioma, or peritoneal   inclusion cyst. Adjacent thickened loops of bowel are likely reactive.   Continued follow-up to resolution. General Surgery Progress Note  B Team [id number]    Subjective & interval events:  -patient reports pain unchanged this am  -subjective fevers & diaphoresis overnight      Objective:  Vital Signs Last 24 Hrs  T(C): 36.9 (31 Jan 2018 01:57), Max: 37.5 (30 Jan 2018 17:37)  T(F): 98.4 (31 Jan 2018 01:57), Max: 99.5 (30 Jan 2018 17:37)  HR: 74 (31 Jan 2018 01:57) (74 - 93)  BP: 97/55 (31 Jan 2018 01:57) (97/55 - 115/67)  BP(mean): --  RR: 18 (31 Jan 2018 01:57) (18 - 19)  SpO2: 100% (31 Jan 2018 01:57) (97% - 100%)      I&O's Summary    30 Jan 2018 07:01  -  31 Jan 2018 07:00  --------------------------------------------------------  IN: 0 mL / OUT: 1350 mL / NET: -1350 mL      I&O's Detail    30 Jan 2018 07:01  -  31 Jan 2018 07:00  --------------------------------------------------------  IN:  Total IN: 0 mL    OUT:    Emesis: 100 mL    Voided: 1250 mL  Total OUT: 1350 mL    Total NET: -1350 mL          PE:  Gen: NAD, asleep on entering  Pulm: breathing comfortably on RA  CV: regular  Abd: soft, nondistended, tender RLQ; trocar site w/ dermabond open to air, no drainage/erythema      MEDICATIONS  (STANDING):  heparin  Injectable 5000 Unit(s) SubCutaneous every 12 hours  lactated ringers. 1000 milliLiter(s) (125 mL/Hr) IV Continuous <Continuous>  levothyroxine 75 MICROGram(s) Oral daily  piperacillin/tazobactam IVPB. 3.375 Gram(s) IV Intermittent every 8 hours  sertraline 100 milliGRAM(s) Oral daily    MEDICATIONS  (PRN):  acetaminophen   Tablet. 650 milliGRAM(s) Oral every 6 hours PRN Mild Pain (1 - 3)  HYDROmorphone  Injectable 1 milliGRAM(s) IV Push every 6 hours PRN Severe Pain (7 - 10)  LORazepam     Tablet 0.5 milliGRAM(s) Oral at bedtime PRN Anxiety  ondansetron Injectable 4 milliGRAM(s) IV Push once PRN Nausea and/or Vomiting                            11.4   4.73  )-----------( 218      ( 31 Jan 2018 04:26 )             34.0   01-29    140  |  102  |  8   ----------------------------<  83  Test not performed SPECIMEN GROSSLY HEMOLYZED   |  22  |  0.72    Ca    8.9      29 Jan 2018 17:20    TPro  7.6  /  Alb  3.9  /  TBili  0.4  /  DBili  x   /  AST  41<H>  /  ALT  32  /  AlkPhos  64  01-29      PT/INR - ( 30 Jan 2018 06:46 )   PT: 12.6 SEC;   INR: 1.13     PTT - ( 30 Jan 2018 06:46 )  PTT:39.7 SEC    Type + Screen (01.11.18 @ 12:11)    ABO Interpretation: O    Rh Interpretation: Positive    Antibody Screen: Negative      Imaging:  CT Abdomen and Pelvis w/ Oral Cont and w/ IV Cont (01.29.18 @ 23:13) >  IMPRESSION:     Loculated peripherally enhancing fluid collections within the pelvis in   the region of the right adnexa and cul-de-sac due to   hydrosalpinx/pyosalpinx, seroma, abscess, endometrioma, or peritoneal   inclusion cyst. Adjacent thickened loops of bowel are likely reactive.   Continued follow-up to resolution.

## 2018-01-31 NOTE — PROGRESS NOTE ADULT - ASSESSMENT
47F p/w abdominal pain, evidence of pelvic fluid collection and hydrosalpinx, s/p laparoscopic R ovarian cystectomy and partial oophorectomy (1/11/18).    - recommend continuing antibiotics  - no urgent surgical needs at this time  - serial abdominal exams and imaging to assess resolution      RICKI John MD (PGY2)    (Please page B team [phone] for questions/concerns.) 47F p/w abdominal pain, evidence of pelvic fluid collection and hydrosalpinx, s/p laparoscopic R ovarian cystectomy and partial oophorectomy (1/11/18).    - recommend continuing antibiotics  - no acute surgery indicated at this time  - serial abdominal exams and imaging to assess resolution      RICKI John MD (PGY2)    (Please page B team [phone] for questions/concerns.) 47F p/w abdominal pain, evidence of pelvic fluid collection and hydrosalpinx, s/p laparoscopic R ovarian cystectomy and partial oophorectomy (1/11/18).    - recommend continuing antibiotics  - no acute surgery indicated at this time  - serial abdominal exams and interval imaging to assess resolution      RICKI John MD (PGY2)    (Please page B team [phone] for questions/concerns.)

## 2018-02-01 LAB
ALBUMIN SERPL ELPH-MCNC: 3 G/DL — LOW (ref 3.3–5)
ALP SERPL-CCNC: 64 U/L — SIGNIFICANT CHANGE UP (ref 40–120)
ALT FLD-CCNC: 28 U/L — SIGNIFICANT CHANGE UP (ref 4–33)
AST SERPL-CCNC: 18 U/L — SIGNIFICANT CHANGE UP (ref 4–32)
BASOPHILS # BLD AUTO: 0.02 K/UL — SIGNIFICANT CHANGE UP (ref 0–0.2)
BASOPHILS NFR BLD AUTO: 0.6 % — SIGNIFICANT CHANGE UP (ref 0–2)
BILIRUB SERPL-MCNC: 0.2 MG/DL — SIGNIFICANT CHANGE UP (ref 0.2–1.2)
BUN SERPL-MCNC: 7 MG/DL — SIGNIFICANT CHANGE UP (ref 7–23)
CALCIUM SERPL-MCNC: 8 MG/DL — LOW (ref 8.4–10.5)
CHLORIDE SERPL-SCNC: 107 MMOL/L — SIGNIFICANT CHANGE UP (ref 98–107)
CO2 SERPL-SCNC: 26 MMOL/L — SIGNIFICANT CHANGE UP (ref 22–31)
CREAT SERPL-MCNC: 0.55 MG/DL — SIGNIFICANT CHANGE UP (ref 0.5–1.3)
EOSINOPHIL # BLD AUTO: 0.13 K/UL — SIGNIFICANT CHANGE UP (ref 0–0.5)
EOSINOPHIL NFR BLD AUTO: 3.8 % — SIGNIFICANT CHANGE UP (ref 0–6)
GLUCOSE SERPL-MCNC: 93 MG/DL — SIGNIFICANT CHANGE UP (ref 70–99)
HCT VFR BLD CALC: 32.5 % — LOW (ref 34.5–45)
HGB BLD-MCNC: 10.5 G/DL — LOW (ref 11.5–15.5)
IMM GRANULOCYTES # BLD AUTO: 0.01 # — SIGNIFICANT CHANGE UP
IMM GRANULOCYTES NFR BLD AUTO: 0.3 % — SIGNIFICANT CHANGE UP (ref 0–1.5)
LYMPHOCYTES # BLD AUTO: 1.08 K/UL — SIGNIFICANT CHANGE UP (ref 1–3.3)
LYMPHOCYTES # BLD AUTO: 31.7 % — SIGNIFICANT CHANGE UP (ref 13–44)
MCHC RBC-ENTMCNC: 26.9 PG — LOW (ref 27–34)
MCHC RBC-ENTMCNC: 32.3 % — SIGNIFICANT CHANGE UP (ref 32–36)
MCV RBC AUTO: 83.3 FL — SIGNIFICANT CHANGE UP (ref 80–100)
MONOCYTES # BLD AUTO: 0.25 K/UL — SIGNIFICANT CHANGE UP (ref 0–0.9)
MONOCYTES NFR BLD AUTO: 7.3 % — SIGNIFICANT CHANGE UP (ref 2–14)
NEUTROPHILS # BLD AUTO: 1.92 K/UL — SIGNIFICANT CHANGE UP (ref 1.8–7.4)
NEUTROPHILS NFR BLD AUTO: 56.3 % — SIGNIFICANT CHANGE UP (ref 43–77)
NRBC # FLD: 0 — SIGNIFICANT CHANGE UP
PLATELET # BLD AUTO: 212 K/UL — SIGNIFICANT CHANGE UP (ref 150–400)
PMV BLD: 10.2 FL — SIGNIFICANT CHANGE UP (ref 7–13)
POTASSIUM SERPL-MCNC: 3.8 MMOL/L — SIGNIFICANT CHANGE UP (ref 3.5–5.3)
POTASSIUM SERPL-SCNC: 3.8 MMOL/L — SIGNIFICANT CHANGE UP (ref 3.5–5.3)
PROT SERPL-MCNC: 5.8 G/DL — LOW (ref 6–8.3)
RBC # BLD: 3.9 M/UL — SIGNIFICANT CHANGE UP (ref 3.8–5.2)
RBC # FLD: 13.3 % — SIGNIFICANT CHANGE UP (ref 10.3–14.5)
SODIUM SERPL-SCNC: 145 MMOL/L — SIGNIFICANT CHANGE UP (ref 135–145)
WBC # BLD: 3.41 K/UL — LOW (ref 3.8–10.5)
WBC # FLD AUTO: 3.41 K/UL — LOW (ref 3.8–10.5)

## 2018-02-01 PROCEDURE — 74177 CT ABD & PELVIS W/CONTRAST: CPT | Mod: 26

## 2018-02-01 PROCEDURE — G9001: CPT

## 2018-02-01 PROCEDURE — 99231 SBSQ HOSP IP/OBS SF/LOW 25: CPT

## 2018-02-01 RX ORDER — SERTRALINE 25 MG/1
200 TABLET, FILM COATED ORAL DAILY
Qty: 0 | Refills: 0 | Status: DISCONTINUED | OUTPATIENT
Start: 2018-02-01 | End: 2018-02-02

## 2018-02-01 RX ORDER — HEPARIN SODIUM 5000 [USP'U]/ML
5000 INJECTION INTRAVENOUS; SUBCUTANEOUS EVERY 12 HOURS
Qty: 0 | Refills: 0 | Status: DISCONTINUED | OUTPATIENT
Start: 2018-02-01 | End: 2018-02-02

## 2018-02-01 RX ORDER — ONDANSETRON 8 MG/1
4 TABLET, FILM COATED ORAL ONCE
Qty: 0 | Refills: 0 | Status: COMPLETED | OUTPATIENT
Start: 2018-02-01 | End: 2018-02-01

## 2018-02-01 RX ORDER — CIPROFLOXACIN LACTATE 400MG/40ML
500 VIAL (ML) INTRAVENOUS EVERY 12 HOURS
Qty: 0 | Refills: 0 | Status: DISCONTINUED | OUTPATIENT
Start: 2018-02-01 | End: 2018-02-02

## 2018-02-01 RX ORDER — METRONIDAZOLE 500 MG
500 TABLET ORAL EVERY 8 HOURS
Qty: 0 | Refills: 0 | Status: DISCONTINUED | OUTPATIENT
Start: 2018-02-01 | End: 2018-02-02

## 2018-02-01 RX ORDER — SODIUM CHLORIDE 9 MG/ML
500 INJECTION, SOLUTION INTRAVENOUS ONCE
Qty: 0 | Refills: 0 | Status: DISCONTINUED | OUTPATIENT
Start: 2018-02-01 | End: 2018-02-02

## 2018-02-01 RX ADMIN — PIPERACILLIN AND TAZOBACTAM 25 GRAM(S): 4; .5 INJECTION, POWDER, LYOPHILIZED, FOR SOLUTION INTRAVENOUS at 13:58

## 2018-02-01 RX ADMIN — ONDANSETRON 4 MILLIGRAM(S): 8 TABLET, FILM COATED ORAL at 22:38

## 2018-02-01 RX ADMIN — HEPARIN SODIUM 5000 UNIT(S): 5000 INJECTION INTRAVENOUS; SUBCUTANEOUS at 17:56

## 2018-02-01 RX ADMIN — Medication 500 MILLIGRAM(S): at 17:56

## 2018-02-01 RX ADMIN — HEPARIN SODIUM 5000 UNIT(S): 5000 INJECTION INTRAVENOUS; SUBCUTANEOUS at 05:23

## 2018-02-01 RX ADMIN — HYDROMORPHONE HYDROCHLORIDE 1 MILLIGRAM(S): 2 INJECTION INTRAMUSCULAR; INTRAVENOUS; SUBCUTANEOUS at 13:58

## 2018-02-01 RX ADMIN — OXYCODONE HYDROCHLORIDE 5 MILLIGRAM(S): 5 TABLET ORAL at 22:39

## 2018-02-01 RX ADMIN — OXYCODONE HYDROCHLORIDE 5 MILLIGRAM(S): 5 TABLET ORAL at 23:15

## 2018-02-01 RX ADMIN — Medication 600 MILLIGRAM(S): at 19:46

## 2018-02-01 RX ADMIN — Medication 0.5 MILLIGRAM(S): at 22:48

## 2018-02-01 RX ADMIN — Medication 650 MILLIGRAM(S): at 10:35

## 2018-02-01 RX ADMIN — HYDROMORPHONE HYDROCHLORIDE 1 MILLIGRAM(S): 2 INJECTION INTRAMUSCULAR; INTRAVENOUS; SUBCUTANEOUS at 06:21

## 2018-02-01 RX ADMIN — PIPERACILLIN AND TAZOBACTAM 25 GRAM(S): 4; .5 INJECTION, POWDER, LYOPHILIZED, FOR SOLUTION INTRAVENOUS at 05:23

## 2018-02-01 RX ADMIN — HYDROMORPHONE HYDROCHLORIDE 1 MILLIGRAM(S): 2 INJECTION INTRAMUSCULAR; INTRAVENOUS; SUBCUTANEOUS at 14:30

## 2018-02-01 RX ADMIN — ONDANSETRON 4 MILLIGRAM(S): 8 TABLET, FILM COATED ORAL at 09:56

## 2018-02-01 RX ADMIN — Medication 500 MILLIGRAM(S): at 22:39

## 2018-02-01 RX ADMIN — Medication 75 MICROGRAM(S): at 05:23

## 2018-02-01 RX ADMIN — Medication 650 MILLIGRAM(S): at 09:56

## 2018-02-01 RX ADMIN — SERTRALINE 200 MILLIGRAM(S): 25 TABLET, FILM COATED ORAL at 22:38

## 2018-02-01 RX ADMIN — HYDROMORPHONE HYDROCHLORIDE 1 MILLIGRAM(S): 2 INJECTION INTRAMUSCULAR; INTRAVENOUS; SUBCUTANEOUS at 05:23

## 2018-02-01 RX ADMIN — SODIUM CHLORIDE 125 MILLILITER(S): 9 INJECTION, SOLUTION INTRAVENOUS at 13:58

## 2018-02-01 RX ADMIN — Medication 600 MILLIGRAM(S): at 18:46

## 2018-02-01 NOTE — CONSULT NOTE ADULT - ASSESSMENT
46 yo with pelvic collection now s/p surgical procedure on 1/11/18   I sat down with the patient and her  and discussed management options for collection and endometriosis.  She had pervious endometrial ablation and menses are very light.  We discussed hormonal suppression of endometriosis and pain medication.  Discussed use of oral contraceptive, Depo Provera, Depo Lupron.  NASIDs for pain and short interval use of narcotics. Surgical management of collection and endometriosis including evacuation of collection, salpingectomy, oophorectomy, hysterectomy.  We reviewed risk benefits and alternatives of each including but not limited to bleeding, infection and damage to nearby organs such as bowel, bladder and ureter.  Since there is inflammation and reactive changes in small bowel chance of injury's with emergency/urgent surgery is higher.  Discussed possible need for further surgery, possible diverting ileostomy.  She was given literature about endometriosis and treatment options.  All questions and concerns addressed, patient expressed understanding.  She will inform us of her decision.
47 year old female with abdominal pain, evidence of pelvic fluid collection and hydrosalpinx, POD#19 from laparoscopic R ovarian cystectomy and partial oophorectomy, hemodynamically stable and afebrile.    - recommend continuing antibiotics  - no urgent surgical needs at this time  - serial abdominal exams and imaging to assess resolution    Discussed with Dr. Iglesias  --COLETTE Ngo, [id number]

## 2018-02-01 NOTE — PROGRESS NOTE ADULT - ASSESSMENT
Pt is a 46 yo POD# 21 s/p LSC R ovarian cystectomy, partial R oophorectomy who presented 3 nights ago th R lower abdominal pain and found to have an intraabdominal fluid collection on CT. IR was consulted and at this time there is no intervention, as fluid is an interloop fluid collection.  Pt has been afebrile. Pt is still experiencing abd pain, but she states that it is slightly improved with current pain regiment. Overnight pt tolerated a regular diet. Pt is NPO this AM. Pt to received a CT A/P this AM to assess intraabdominal fluid collection

## 2018-02-01 NOTE — PROGRESS NOTE ADULT - SUBJECTIVE AND OBJECTIVE BOX
GYN Progress Note  POD#  21 HD#3    Patient seen and examined at bedside, no acute overnight events. Pt still has RLQ pain but it is improved from yesterday.  Pt had two loose stools last night. She states that pain was worse with BM. She tolerated a regular diet last night. No n/v. She is currently NPO. Pt to get CT later this AM.   Patient is ambulating, passing flatus, voiding spontaneously, and tolerating regular diet. Denies CP, SOB, N/V, fevers, and chills.    Vital Signs Last 24 Hours  T(C): 37 (02-01-18 @ 05:32), Max: 37.1 (01-31-18 @ 18:08)  HR: 69 (02-01-18 @ 05:32) (69 - 87)  BP: 115/67 (02-01-18 @ 05:32) (108/57 - 121/69)  RR: 19 (02-01-18 @ 05:32) (17 - 19)  SpO2: 98% (02-01-18 @ 05:32) (97% - 100%)    I&O's Summary    30 Jan 2018 07:01  -  31 Jan 2018 07:00  --------------------------------------------------------  IN: 0 mL / OUT: 1350 mL / NET: -1350 mL        Physical Exam:  General: NAD  CV: NR, RR, S1, S2, no M/R/G  Lungs: CTA-B  Abdomen: Soft, non-distended, minimal guarding, no rebound, tender to palpation in RLQ,  +BS  Incision: CDI  Ext: No pain or swelling    Labs:                        10.5   3.41  )-----------( 212      ( 01 Feb 2018 04:10 )             32.5   baso 0.6    eos 3.8    imm gran 0.3    lymph 31.7   mono 7.3    poly 56.3                         11.4   4.73  )-----------( 218      ( 31 Jan 2018 04:26 )             34.0   baso 0.4    eos 1.7    imm gran 0.2    lymph 19.5   mono 7.8    poly 70.4                         12.4   5.94  )-----------( 248      ( 29 Jan 2018 18:45 )             38.7   baso 0.3    eos 1.9    imm gran 0.2    lymph 21.7   mono 5.6    poly 70.3     02-01    145  |  107  |  7   ----------------------------<  93  3.8   |  26  |  0.55    Ca    8.0<L>      01 Feb 2018 04:10    TPro  5.8<L>  /  Alb  3.0<L>  /  TBili  0.2  /  DBili  x   /  AST  18  /  ALT  28  /  AlkPhos  64  02-01    PT/INR - ( 30 Jan 2018 06:46 )   PT: 12.6 SEC;   INR: 1.13          PTT - ( 30 Jan 2018 06:46 )  PTT:39.7 SEC      Blood Type: O Positive      MEDICATIONS  (STANDING):  ibuprofen  Tablet 600 milliGRAM(s) Oral every 6 hours  lactated ringers. 1000 milliLiter(s) (125 mL/Hr) IV Continuous <Continuous>  levothyroxine 75 MICROGram(s) Oral daily  piperacillin/tazobactam IVPB. 3.375 Gram(s) IV Intermittent every 8 hours  sertraline 100 milliGRAM(s) Oral daily    MEDICATIONS  (PRN):  acetaminophen   Tablet. 650 milliGRAM(s) Oral every 6 hours PRN Mild Pain (1 - 3)  HYDROmorphone  Injectable 1 milliGRAM(s) IV Push every 6 hours PRN Severe Pain (7 - 10)  LORazepam     Tablet 0.5 milliGRAM(s) Oral at bedtime PRN Anxiety  ondansetron Injectable 4 milliGRAM(s) IV Push once PRN Nausea and/or Vomiting  oxyCODONE    IR 5 milliGRAM(s) Oral every 4 hours PRN Severe Pain (7 - 10)

## 2018-02-01 NOTE — CONSULT NOTE ADULT - SUBJECTIVE AND OBJECTIVE BOX
46yo  perimenopausal with endometriosis diagnosed during s/p laparoscopic right ovarian cystectomy, partial salpingectomy, partial oophorectomy (done on 18) presented to the emergency department with worsening pelvic pain. Patient reports acute onset of severe lower abdominal cramps 7 nights ago, worse on right side. She denies aggravating or alleviating factors, stating that Tylenol barely "Takes the edge off." Denies nausea/vomiting at home but is starting to feel nauseous in ED.  She was admitted on 18 and started on IV antibiotics, normal WBC and afebrile.  Taking Tylenol and Dilaudid for pain.    Vital Signs Last 24 Hrs  T(C): 36.9 (2018 14:09), Max: 37.1 (2018 18:08)  T(F): 98.5 (2018 14:09), Max: 98.8 (2018 18:08)  HR: 64 (2018 14:09) (64 - 87)  BP: 124/74 (2018 14:09) (108/57 - 124/74)  BP(mean): --  RR: 17 (2018 14:09) (17 - 19)  SpO2: 99% (2018 14:09) (97% - 100%)    abdomen: soft, non distended, no rebound, no guarding, lower abdominal tenderness right greater than left

## 2018-02-02 ENCOUNTER — APPOINTMENT (OUTPATIENT)
Dept: INTERNAL MEDICINE | Facility: CLINIC | Age: 48
End: 2018-02-02

## 2018-02-02 ENCOUNTER — TRANSCRIPTION ENCOUNTER (OUTPATIENT)
Age: 48
End: 2018-02-02

## 2018-02-02 VITALS
HEART RATE: 78 BPM | SYSTOLIC BLOOD PRESSURE: 112 MMHG | OXYGEN SATURATION: 100 % | DIASTOLIC BLOOD PRESSURE: 69 MMHG | RESPIRATION RATE: 18 BRPM | TEMPERATURE: 99 F

## 2018-02-02 LAB
BASOPHILS # BLD AUTO: 0.02 K/UL — SIGNIFICANT CHANGE UP (ref 0–0.2)
BASOPHILS NFR BLD AUTO: 0.5 % — SIGNIFICANT CHANGE UP (ref 0–2)
EOSINOPHIL # BLD AUTO: 0.15 K/UL — SIGNIFICANT CHANGE UP (ref 0–0.5)
EOSINOPHIL NFR BLD AUTO: 4.1 % — SIGNIFICANT CHANGE UP (ref 0–6)
HCT VFR BLD CALC: 33.5 % — LOW (ref 34.5–45)
HGB BLD-MCNC: 11 G/DL — LOW (ref 11.5–15.5)
IMM GRANULOCYTES # BLD AUTO: 0.01 # — SIGNIFICANT CHANGE UP
IMM GRANULOCYTES NFR BLD AUTO: 0.3 % — SIGNIFICANT CHANGE UP (ref 0–1.5)
LYMPHOCYTES # BLD AUTO: 1.27 K/UL — SIGNIFICANT CHANGE UP (ref 1–3.3)
LYMPHOCYTES # BLD AUTO: 34.7 % — SIGNIFICANT CHANGE UP (ref 13–44)
MCHC RBC-ENTMCNC: 27.4 PG — SIGNIFICANT CHANGE UP (ref 27–34)
MCHC RBC-ENTMCNC: 32.8 % — SIGNIFICANT CHANGE UP (ref 32–36)
MCV RBC AUTO: 83.5 FL — SIGNIFICANT CHANGE UP (ref 80–100)
MONOCYTES # BLD AUTO: 0.28 K/UL — SIGNIFICANT CHANGE UP (ref 0–0.9)
MONOCYTES NFR BLD AUTO: 7.7 % — SIGNIFICANT CHANGE UP (ref 2–14)
NEUTROPHILS # BLD AUTO: 1.93 K/UL — SIGNIFICANT CHANGE UP (ref 1.8–7.4)
NEUTROPHILS NFR BLD AUTO: 52.7 % — SIGNIFICANT CHANGE UP (ref 43–77)
NRBC # FLD: 0 — SIGNIFICANT CHANGE UP
PLATELET # BLD AUTO: 227 K/UL — SIGNIFICANT CHANGE UP (ref 150–400)
PMV BLD: 10.2 FL — SIGNIFICANT CHANGE UP (ref 7–13)
RBC # BLD: 4.01 M/UL — SIGNIFICANT CHANGE UP (ref 3.8–5.2)
RBC # FLD: 13 % — SIGNIFICANT CHANGE UP (ref 10.3–14.5)
WBC # BLD: 3.66 K/UL — LOW (ref 3.8–10.5)
WBC # FLD AUTO: 3.66 K/UL — LOW (ref 3.8–10.5)

## 2018-02-02 PROCEDURE — 99238 HOSP IP/OBS DSCHRG MGMT 30/<: CPT

## 2018-02-02 RX ORDER — LEVOTHYROXINE SODIUM 125 MCG
1 TABLET ORAL
Qty: 0 | Refills: 0 | COMMUNITY
Start: 2018-02-02

## 2018-02-02 RX ORDER — ALBUTEROL 90 UG/1
0 AEROSOL, METERED ORAL
Qty: 0 | Refills: 0 | COMMUNITY

## 2018-02-02 RX ORDER — METRONIDAZOLE 500 MG
1 TABLET ORAL
Qty: 18 | Refills: 0 | OUTPATIENT
Start: 2018-02-02 | End: 2018-02-07

## 2018-02-02 RX ORDER — OXYCODONE HYDROCHLORIDE 5 MG/1
1 TABLET ORAL
Qty: 0 | Refills: 0 | COMMUNITY
Start: 2018-02-02

## 2018-02-02 RX ORDER — ONDANSETRON 8 MG/1
1 TABLET, FILM COATED ORAL
Qty: 20 | Refills: 0 | OUTPATIENT
Start: 2018-02-02

## 2018-02-02 RX ORDER — FLUNISOLIDE 80 UG/1
0 AEROSOL, METERED RESPIRATORY (INHALATION)
Qty: 0 | Refills: 0 | COMMUNITY

## 2018-02-02 RX ORDER — METRONIDAZOLE 500 MG
1 TABLET ORAL
Qty: 0 | Refills: 0 | COMMUNITY
Start: 2018-02-02

## 2018-02-02 RX ORDER — CIPROFLOXACIN LACTATE 400MG/40ML
1 VIAL (ML) INTRAVENOUS
Qty: 12 | Refills: 0 | OUTPATIENT
Start: 2018-02-02 | End: 2018-02-07

## 2018-02-02 RX ORDER — FEXOFENADINE HCL 30 MG
1 TABLET ORAL
Qty: 0 | Refills: 0 | COMMUNITY

## 2018-02-02 RX ORDER — ONDANSETRON 8 MG/1
4 TABLET, FILM COATED ORAL EVERY 6 HOURS
Qty: 0 | Refills: 0 | Status: DISCONTINUED | OUTPATIENT
Start: 2018-02-02 | End: 2018-02-02

## 2018-02-02 RX ORDER — OMEPRAZOLE 10 MG/1
1 CAPSULE, DELAYED RELEASE ORAL
Qty: 0 | Refills: 0 | COMMUNITY

## 2018-02-02 RX ORDER — CIPROFLOXACIN LACTATE 400MG/40ML
1 VIAL (ML) INTRAVENOUS
Qty: 0 | Refills: 0 | COMMUNITY
Start: 2018-02-02

## 2018-02-02 RX ORDER — LEVOTHYROXINE SODIUM 125 MCG
1 TABLET ORAL
Qty: 0 | Refills: 0 | COMMUNITY

## 2018-02-02 RX ORDER — SERTRALINE 25 MG/1
2 TABLET, FILM COATED ORAL
Qty: 0 | Refills: 0 | COMMUNITY

## 2018-02-02 RX ORDER — OXYCODONE HYDROCHLORIDE 5 MG/1
1 TABLET ORAL
Qty: 10 | Refills: 0 | OUTPATIENT
Start: 2018-02-02

## 2018-02-02 RX ORDER — SODIUM CHLORIDE 9 MG/ML
3 INJECTION INTRAMUSCULAR; INTRAVENOUS; SUBCUTANEOUS EVERY 8 HOURS
Qty: 0 | Refills: 0 | Status: DISCONTINUED | OUTPATIENT
Start: 2018-02-02 | End: 2018-02-02

## 2018-02-02 RX ADMIN — HEPARIN SODIUM 5000 UNIT(S): 5000 INJECTION INTRAVENOUS; SUBCUTANEOUS at 06:52

## 2018-02-02 RX ADMIN — Medication 500 MILLIGRAM(S): at 13:36

## 2018-02-02 RX ADMIN — Medication 75 MICROGRAM(S): at 06:52

## 2018-02-02 RX ADMIN — ONDANSETRON 4 MILLIGRAM(S): 8 TABLET, FILM COATED ORAL at 11:36

## 2018-02-02 RX ADMIN — Medication 600 MILLIGRAM(S): at 06:51

## 2018-02-02 RX ADMIN — Medication 500 MILLIGRAM(S): at 06:52

## 2018-02-02 RX ADMIN — Medication 600 MILLIGRAM(S): at 11:37

## 2018-02-02 RX ADMIN — Medication 600 MILLIGRAM(S): at 12:08

## 2018-02-02 RX ADMIN — Medication 600 MILLIGRAM(S): at 07:30

## 2018-02-02 NOTE — DISCHARGE NOTE OB - CARE PROVIDER_API CALL
Lucas Nagy), Obstetrics and Gynecology  [address] [facility]  2nd [place] [address]  Phone: [phone]  Fax: [phone]

## 2018-02-02 NOTE — DISCHARGE NOTE ADULT - CARE PROVIDERS DIRECT ADDRESSES
[email or web address]

## 2018-02-02 NOTE — DISCHARGE NOTE ADULT - INSTRUCTIONS
Notify MD if there is pain unrelieved by pain meds, nausea/vomiting or unable to have a BM.   Also if abd feel board like.  Drink 6-8 glasses of water daily.

## 2018-02-02 NOTE — DISCHARGE NOTE ADULT - PLAN OF CARE
Recovery Finish taking your abx cipro/flagyl   Regular diet as tolerated, regular activity as tolerated, no heavy lifting for first two weeks.  Nothing per vagina: no intercourse, tampons or douching.  Call your provider if you experience fevers, chills, worsening abdominal pain, inability to urinate or worsening vaginal bleeding.  Follow up with your provider in 2 weeks.

## 2018-02-02 NOTE — DISCHARGE NOTE ADULT - CONDITIONS AT DISCHARGE
Vitals stable, tolerated regular diet and voiding without difficulty,  Pt verbalize all discharge and medication instructions including the need for MD follow up visit.

## 2018-02-02 NOTE — DISCHARGE NOTE ADULT - MEDICATION SUMMARY - MEDICATIONS TO CHANGE
I will SWITCH the dose or number of times a day I take the medications listed below when I get home from the hospital:  None I will SWITCH the dose or number of times a day I take the medications listed below when I get home from the hospital:    ciprofloxacin 500 mg oral tablet  -- 1 tab(s) by mouth 2 times a day  -- Avoid prolonged or excessive exposure to direct and/or artificial sunlight while taking this medication.  Check with your doctor before becoming pregnant.  Do not take dairy products, antacids, or iron preparations within one hour of this medication.  Finish all this medication unless otherwise directed by prescriber.  Medication should be taken with plenty of water.    Flagyl 500 mg oral tablet  -- 1 tab(s) by mouth every 8 hours   -- Do not drink alcoholic beverages when taking this medication.  Finish all this medication unless otherwise directed by prescriber.  May discolor urine or feces.

## 2018-02-02 NOTE — DISCHARGE NOTE ADULT - MEDICATION SUMMARY - MEDICATIONS TO TAKE
I will START or STAY ON the medications listed below when I get home from the hospital:  None I will START or STAY ON the medications listed below when I get home from the hospital:    Zofran 8 mg oral tablet  -- 1 tab(s) by mouth every 6 hours, As Needed -for nausea   -- Indication: For nausea I will START or STAY ON the medications listed below when I get home from the hospital:    metroNIDAZOLE 500 mg oral tablet  -- 1 tab(s) by mouth every 8 hours  -- Indication: For Antibiotics    oxyCODONE 5 mg oral tablet  -- 1 tab(s) by mouth every 4 hours, As needed, Severe Pain (7 - 10)  -- Indication: For Pain    ciprofloxacin 500 mg oral tablet  -- 1 tab(s) by mouth every 12 hours  -- Indication: For Antibiotcs    levothyroxine 75 mcg (0.075 mg) oral tablet  -- 1 tab(s) by mouth once a day  -- Indication: For Home

## 2018-02-02 NOTE — PROGRESS NOTE ADULT - SUBJECTIVE AND OBJECTIVE BOX
General Surgery Progress Note  B Team [id number]    Subjective & interval events:  -patient reports pain unchanged this am  -CT yesterday, unchanged      Objective:  Vital Signs Last 24 Hrs  T(C): 36.8 (02 Feb 2018 06:57), Max: 37.2 (01 Feb 2018 21:46)  T(F): 98.2 (02 Feb 2018 06:57), Max: 98.9 (01 Feb 2018 21:46)  HR: 66 (02 Feb 2018 06:57) (64 - 70)  BP: 124/70 (02 Feb 2018 06:57) (103/57 - 129/78)  BP(mean): --  RR: 17 (02 Feb 2018 06:57) (17 - 18)  SpO2: 99% (02 Feb 2018 06:57) (99% - 100%)      I&O's Summary    01 Feb 2018 07:01  -  02 Feb 2018 07:00  --------------------------------------------------------  IN: 1500 mL / OUT: 1100 mL / NET: 400 mL      I&O's Detail    01 Feb 2018 07:01  -  02 Feb 2018 07:00  --------------------------------------------------------  IN:    lactated ringers.: 1500 mL  Total IN: 1500 mL    OUT:    Voided: 1100 mL  Total OUT: 1100 mL    Total NET: 400 mL          PE:  Gen: NAD  Pulm: breathing comfortably on RA  CV: regular  Abd: soft, nondistended, tender RLQ; trocar site w/ dermabond open to air, no drainage/erythema      MEDICATIONS  (STANDING):  ciprofloxacin     Tablet 500 milliGRAM(s) Oral every 12 hours  heparin  Injectable 5000 Unit(s) SubCutaneous every 12 hours  ibuprofen  Tablet 600 milliGRAM(s) Oral every 6 hours  lactated ringers Bolus 500 milliLiter(s) IV Bolus once  lactated ringers. 1000 milliLiter(s) (125 mL/Hr) IV Continuous <Continuous>  levothyroxine 75 MICROGram(s) Oral daily  metroNIDAZOLE    Tablet 500 milliGRAM(s) Oral every 8 hours  sertraline 200 milliGRAM(s) Oral daily  sodium chloride 0.9% lock flush 3 milliLiter(s) IV Push every 8 hours    MEDICATIONS  (PRN):  acetaminophen   Tablet. 650 milliGRAM(s) Oral every 6 hours PRN Mild Pain (1 - 3)  LORazepam     Tablet 0.5 milliGRAM(s) Oral at bedtime PRN Anxiety  oxyCODONE    IR 5 milliGRAM(s) Oral every 4 hours PRN Severe Pain (7 - 10)      Labs:                        11.0   3.66  )-----------( 227      ( 02 Feb 2018 04:40 )             33.5                           11.4   4.73  )-----------( 218      ( 31 Jan 2018 04:26 )             34.0     02-01    145  |  107  |  7   ----------------------------<  93  3.8   |  26  |  0.55    Ca    8.0<L>      01 Feb 2018 04:10    TPro  5.8<L>  /  Alb  3.0<L>  /  TBili  0.2  /  DBili  x   /  AST  18  /  ALT  28  /  AlkPhos  64  02-01 01-29    140  |  102  |  8   ----------------------------<  83  Test not performed SPECIMEN GROSSLY HEMOLYZED   |  22  |  0.72    Ca    8.9      29 Jan 2018 17:20    TPro  7.6  /  Alb  3.9  /  TBili  0.4  /  DBili  x   /  AST  41<H>  /  ALT  32  /  AlkPhos  64  01-29      PT/INR - ( 30 Jan 2018 06:46 )   PT: 12.6 SEC;   INR: 1.13     PTT - ( 30 Jan 2018 06:46 )  PTT:39.7 SEC      Type + Screen (01.11.18 @ 12:11)    ABO Interpretation: O    Rh Interpretation: Positive    Antibody Screen: Negative      Imaging:  CT Abdomen and Pelvis w/ Oral Cont and w/ IV Cont (02.01.18 @ 13:08) >    IMPRESSION: Loculated peripherally enhancing fluid collections within the   pelvis in the region of the right adnexa and cul-de-sac, associated with   adjacent inflammatory stranding, demonstrating minimal improvement to no   significant change. Adjacent thickened loops of bowel are likely   reactive. Continued follow-up to resolution is recommended.       CT Abdomen and Pelvis w/ Oral Cont and w/ IV Cont (01.29.18 @ 23:13) >  IMPRESSION:     Loculated peripherally enhancing fluid collections within the pelvis in   the region of the right adnexa and cul-de-sac due to   hydrosalpinx/pyosalpinx, seroma, abscess, endometrioma, or peritoneal   inclusion cyst. Adjacent thickened loops of bowel are likely reactive.   Continued follow-up to resolution. General Surgery Progress Note  B Team [id number]    Subjective & interval events:  -patient reports pain unchanged this am  -CT yesterday, unchanged  -tolerating some clears; denies N/V  -reports flatus, no BM    Objective:  Vital Signs Last 24 Hrs  T(C): 36.8 (02 Feb 2018 06:57), Max: 37.2 (01 Feb 2018 21:46)  T(F): 98.2 (02 Feb 2018 06:57), Max: 98.9 (01 Feb 2018 21:46)  HR: 66 (02 Feb 2018 06:57) (64 - 70)  BP: 124/70 (02 Feb 2018 06:57) (103/57 - 129/78)  BP(mean): --  RR: 17 (02 Feb 2018 06:57) (17 - 18)  SpO2: 99% (02 Feb 2018 06:57) (99% - 100%)      I&O's Summary    01 Feb 2018 07:01  -  02 Feb 2018 07:00  --------------------------------------------------------  IN: 1500 mL / OUT: 1100 mL / NET: 400 mL      I&O's Detail    01 Feb 2018 07:01  -  02 Feb 2018 07:00  --------------------------------------------------------  IN:    lactated ringers.: 1500 mL  Total IN: 1500 mL    OUT:    Voided: 1100 mL  Total OUT: 1100 mL    Total NET: 400 mL          PE:  Gen: NAD  Pulm: breathing comfortably on RA  CV: regular  Abd: soft, nondistended, tender RLQ; trocar site w/ dermabond open to air, no drainage/erythema      MEDICATIONS  (STANDING):  ciprofloxacin     Tablet 500 milliGRAM(s) Oral every 12 hours  heparin  Injectable 5000 Unit(s) SubCutaneous every 12 hours  ibuprofen  Tablet 600 milliGRAM(s) Oral every 6 hours  lactated ringers Bolus 500 milliLiter(s) IV Bolus once  lactated ringers. 1000 milliLiter(s) (125 mL/Hr) IV Continuous <Continuous>  levothyroxine 75 MICROGram(s) Oral daily  metroNIDAZOLE    Tablet 500 milliGRAM(s) Oral every 8 hours  sertraline 200 milliGRAM(s) Oral daily  sodium chloride 0.9% lock flush 3 milliLiter(s) IV Push every 8 hours    MEDICATIONS  (PRN):  acetaminophen   Tablet. 650 milliGRAM(s) Oral every 6 hours PRN Mild Pain (1 - 3)  LORazepam     Tablet 0.5 milliGRAM(s) Oral at bedtime PRN Anxiety  oxyCODONE    IR 5 milliGRAM(s) Oral every 4 hours PRN Severe Pain (7 - 10)      Labs:                        11.0   3.66  )-----------( 227      ( 02 Feb 2018 04:40 )             33.5                           11.4   4.73  )-----------( 218      ( 31 Jan 2018 04:26 )             34.0     02-01    145  |  107  |  7   ----------------------------<  93  3.8   |  26  |  0.55    Ca    8.0<L>      01 Feb 2018 04:10    TPro  5.8<L>  /  Alb  3.0<L>  /  TBili  0.2  /  DBili  x   /  AST  18  /  ALT  28  /  AlkPhos  64  02-01 01-29    140  |  102  |  8   ----------------------------<  83  Test not performed SPECIMEN GROSSLY HEMOLYZED   |  22  |  0.72    Ca    8.9      29 Jan 2018 17:20    TPro  7.6  /  Alb  3.9  /  TBili  0.4  /  DBili  x   /  AST  41<H>  /  ALT  32  /  AlkPhos  64  01-29      PT/INR - ( 30 Jan 2018 06:46 )   PT: 12.6 SEC;   INR: 1.13     PTT - ( 30 Jan 2018 06:46 )  PTT:39.7 SEC      Type + Screen (01.11.18 @ 12:11)    ABO Interpretation: O    Rh Interpretation: Positive    Antibody Screen: Negative      Imaging:  CT Abdomen and Pelvis w/ Oral Cont and w/ IV Cont (02.01.18 @ 13:08) >    IMPRESSION: Loculated peripherally enhancing fluid collections within the   pelvis in the region of the right adnexa and cul-de-sac, associated with   adjacent inflammatory stranding, demonstrating minimal improvement to no   significant change. Adjacent thickened loops of bowel are likely   reactive. Continued follow-up to resolution is recommended.       CT Abdomen and Pelvis w/ Oral Cont and w/ IV Cont (01.29.18 @ 23:13) >  IMPRESSION:     Loculated peripherally enhancing fluid collections within the pelvis in   the region of the right adnexa and cul-de-sac due to   hydrosalpinx/pyosalpinx, seroma, abscess, endometrioma, or peritoneal   inclusion cyst. Adjacent thickened loops of bowel are likely reactive.   Continued follow-up to resolution.

## 2018-02-02 NOTE — PROGRESS NOTE ADULT - ASSESSMENT
Pt is a 48 yo POD# 22 s/p LSC R ovarian cystectomy, partial R oophorectomy who presented 4 nights ago th R lower abdominal pain and found to have an intraabdominal fluid collection on CT. IR was consulted and at this time there is no intervention, as fluid is an interloop fluid collection.  Pt has been afebrile. Pt was started on IV abx.  Pt had a repeat CT yesterday that showed no significant change. Pt was transition to po abx yesterday.  Pt's pain is overall improving.

## 2018-02-02 NOTE — DISCHARGE NOTE OB - CARE PLAN
Principal Discharge DX:	RLQ abdominal pain  Goal:	Recovery  Assessment and plan of treatment:	Regular diet as tolerated, regular activity as tolerated, no heavy lifting for first two weeks.  Nothing per vagina: no intercourse, tampons or douching.  Call your provider if you experience fevers, chills, worsening abdominal pain, inability to urinate or worsening vaginal bleeding.  Follow up with your provider in 2 weeks.

## 2018-02-02 NOTE — DISCHARGE NOTE ADULT - PATIENT PORTAL LINK FT
“You can access the Follow[employer] Patient Portal, offered by [facility], by registering with the following website: [email or web address]”

## 2018-02-02 NOTE — DISCHARGE NOTE ADULT - CARE PLAN
Principal Discharge DX:	RLQ abdominal pain  Goal:	Recovery  Assessment and plan of treatment:	Finish taking your abx cipro/flagyl   Regular diet as tolerated, regular activity as tolerated, no heavy lifting for first two weeks.  Nothing per vagina: no intercourse, tampons or douching.  Call your provider if you experience fevers, chills, worsening abdominal pain, inability to urinate or worsening vaginal bleeding.  Follow up with your provider in 2 weeks.

## 2018-02-02 NOTE — PROGRESS NOTE ADULT - ASSESSMENT
47F p/w abdominal pain, evidence of pelvic fluid collection and hydrosalpinx, s/p laparoscopic R ovarian cystectomy and partial oophorectomy (1/11/18).    - recommend continuing medical management at this time  - no acute surgery indicated; continue to observe on IV abx  - serial abdominal exams and interval imaging to assess resolution      RICKI John MD (PGY2)    (Please page B team [id number] for questions/concerns.)

## 2018-02-02 NOTE — DISCHARGE NOTE OB - PATIENT PORTAL LINK FT
“You can access the Follow[employer] Patient Portal, offered by [facility], by registering with the following website: [email or web address]”

## 2018-02-02 NOTE — DISCHARGE NOTE ADULT - HOSPITAL COURSE
Patient was admitted on 1/30/18 for RLQ pain. Pt had uncomplicated lsc ovarian cystectomy and partial oophorectomy on 1/11/18  Please see operative note for details During this admission pt had a CT scan that showed some fluid in the pelvis. IR was consulted and stated that there was no IR intervention needed at this time. A repeat CT scan showed no worsening of fluid.  Pt was started on IV abx and transitioned to po abx during this admission. She was afebrile throughout this admission. On day of discharge patient was ambulating, her pain controlled with oral medications, had adequate oral intake, and was voiding freely.  Discharge instructions and precautions were given.  Will have close follow up with Dr. Nagy. Patient was admitted on 1/30/18 for RLQ pain. Pt had uncomplicated lsc ovarian cystectomy and partial oophorectomy on 1/11/18  Please see operative note for details During this admission pt had a CT scan that showed some fluid in the pelvis. IR was consulted and stated that there was no IR intervention needed at this time. A repeat CT scan showed no worsening of fluid.  Pt was started on IV abx and transitioned to po abx during this admission. She was afebrile throughout this admission. On day of discharge patient was ambulating, her pain controlled with oral medications, had adequate oral intake, and was voiding freely.  Discharge instructions and precautions were given.  Will have close follow up with Dr. Mcgregor for evaluation of endometriosis.

## 2018-02-02 NOTE — DISCHARGE NOTE OB - PLAN OF CARE
Recovery Regular diet as tolerated, regular activity as tolerated, no heavy lifting for first two weeks.  Nothing per vagina: no intercourse, tampons or douching.  Call your provider if you experience fevers, chills, worsening abdominal pain, inability to urinate or worsening vaginal bleeding.  Follow up with your provider in 2 weeks.

## 2018-02-02 NOTE — DISCHARGE NOTE OB - HOSPITAL COURSE
Patient was admitted on 1/30/18 for RLQ pain. Pt had uncomplicated lsc ovarian cystectomy and partial oophorectomy on 1/11/18  Please see operative note for details During this admission pt had a CT scan.....  Pt was started on IV abx and transitioned to po abx. She was afebrile throughout this admission. On day of discharge patient was ambulating, her pain controlled with oral medications, had adequate oral intake, and was voiding freely.  Discharge instructions and precautions were given.  Will have close follow up with Dr. Nagy.

## 2018-02-02 NOTE — PROGRESS NOTE ADULT - PROBLEM SELECTOR PLAN 1
Neuro: c/w Tylenol, motrin, oxycodone, and Dilaudid prn   CV: VSS,  CBC wnl  Pulm: Saturating well on room air, encourage incentive spirometry  GI: NPO   : UOP adequate  Heme: Hold HSQ this AM for potential IR consult after CT AP  ID: Continue w/Zosyn. Pt continues to be afebrile with no leukocytosis  Dispo: Pt ordered for CT AP this AM and potential IR versus surgical versus medical management, pending results.
Neuro: c/w Tylenol and Dilaudid prn   CV: VSS, coag in AM for IR  Pulm: Saturating well on room air, encourage incentive spirometry  GI: NPO   : UOP adequate  Heme: HSQ and SCDs for DVT ppx  ID: Will start zosyn this AM. Pt continues to be afebrile with no leukocytosis  Dispo: IR consult this AM for potential drainage of fluid collection
Neuro: c/w Tylenol, motrin, and oxycodone   CV: VSS,  CBC wnl  Pulm: Saturating well on room air, encourage incentive spirometry    PRAFUL Fraga  GI: Reg diet  : UOP adequate  Heme: Hold HSQ this AM for potential IR consult after CT AP  ID: continue w/ po cipro/flagyl. Pt continues to be afebrile with no leukocytosis. If pt continues to have diarrhea today, will send stool sample to rule out c. diff  Dispo: Potential discharge later tonight if pt tolerates reg diet and pain continues to be well controlled.
nasima: Neuro: c/w Tylenol and Dilaudid prn   CV: VSS, AM CBC wnl  Pulm: Saturating well on room air, encourage incentive spirometry  GI: NPO   : UOP adequate  Heme: HSQ and SCDs for DVT ppx  ID: Continue w/Zosyn. Pt continues to be afebrile with no leukocytosis  Dispo: Possible diagnostic lap later today

## 2018-02-02 NOTE — DISCHARGE NOTE ADULT - CARE PROVIDER_API CALL
Lucas Nagy), Obstetrics and Gynecology  [address] [facility]  2nd [place] [address]  Phone: [phone]  Fax: [phone] Neal Mcgregor (MD), Obstetrics and Gynecology  [facility]  [address]th [place] [address]  Phone: [phone]  Fax: [phone]

## 2018-02-09 ENCOUNTER — APPOINTMENT (OUTPATIENT)
Dept: OBGYN | Facility: CLINIC | Age: 48
End: 2018-02-09
Payer: MEDICAID

## 2018-02-09 VITALS
SYSTOLIC BLOOD PRESSURE: 100 MMHG | DIASTOLIC BLOOD PRESSURE: 60 MMHG | WEIGHT: 155 LBS | HEIGHT: 67 IN | BODY MASS INDEX: 24.33 KG/M2

## 2018-02-09 PROCEDURE — 99213 OFFICE O/P EST LOW 20 MIN: CPT

## 2018-02-14 ENCOUNTER — TRANSCRIPTION ENCOUNTER (OUTPATIENT)
Age: 48
End: 2018-02-14

## 2018-02-22 ENCOUNTER — APPOINTMENT (OUTPATIENT)
Dept: ULTRASOUND IMAGING | Facility: CLINIC | Age: 48
End: 2018-02-22
Payer: MEDICAID

## 2018-02-22 ENCOUNTER — OUTPATIENT (OUTPATIENT)
Dept: OUTPATIENT SERVICES | Facility: HOSPITAL | Age: 48
LOS: 1 days | End: 2018-02-22
Payer: MEDICAID

## 2018-02-22 DIAGNOSIS — N63.20 UNSPECIFIED LUMP IN THE LEFT BREAST, UNSPECIFIED QUADRANT: Chronic | ICD-10-CM

## 2018-02-22 DIAGNOSIS — R52 PAIN, UNSPECIFIED: Chronic | ICD-10-CM

## 2018-02-22 DIAGNOSIS — Z00.8 ENCOUNTER FOR OTHER GENERAL EXAMINATION: ICD-10-CM

## 2018-02-22 DIAGNOSIS — R59.0 LOCALIZED ENLARGED LYMPH NODES: Chronic | ICD-10-CM

## 2018-02-22 PROCEDURE — 76856 US EXAM PELVIC COMPLETE: CPT | Mod: 26

## 2018-02-22 PROCEDURE — 76830 TRANSVAGINAL US NON-OB: CPT

## 2018-02-22 PROCEDURE — 76830 TRANSVAGINAL US NON-OB: CPT | Mod: 26

## 2018-02-22 PROCEDURE — 76856 US EXAM PELVIC COMPLETE: CPT

## 2018-02-27 ENCOUNTER — APPOINTMENT (OUTPATIENT)
Dept: OBGYN | Facility: CLINIC | Age: 48
End: 2018-02-27
Payer: MEDICAID

## 2018-02-27 VITALS
HEIGHT: 67 IN | HEART RATE: 73 BPM | DIASTOLIC BLOOD PRESSURE: 88 MMHG | SYSTOLIC BLOOD PRESSURE: 127 MMHG | WEIGHT: 155 LBS | TEMPERATURE: 98.2 F | BODY MASS INDEX: 24.33 KG/M2

## 2018-02-27 DIAGNOSIS — B37.3 CANDIDIASIS OF VULVA AND VAGINA: ICD-10-CM

## 2018-02-27 DIAGNOSIS — Z86.59 PERSONAL HISTORY OF OTHER MENTAL AND BEHAVIORAL DISORDERS: ICD-10-CM

## 2018-02-27 DIAGNOSIS — Z87.42 PERSONAL HISTORY OF OTHER DISEASES OF THE FEMALE GENITAL TRACT: ICD-10-CM

## 2018-02-27 DIAGNOSIS — Z88.9 ALLERGY STATUS TO UNSPECIFIED DRUGS, MEDICAMENTS AND BIOLOGICAL SUBSTANCES: ICD-10-CM

## 2018-02-27 DIAGNOSIS — N83.201 UNSPECIFIED OVARIAN CYST, RIGHT SIDE: ICD-10-CM

## 2018-02-27 DIAGNOSIS — M25.512 PAIN IN LEFT SHOULDER: ICD-10-CM

## 2018-02-27 DIAGNOSIS — Z87.898 PERSONAL HISTORY OF OTHER SPECIFIED CONDITIONS: ICD-10-CM

## 2018-02-27 DIAGNOSIS — R10.2 PELVIC AND PERINEAL PAIN: ICD-10-CM

## 2018-02-27 DIAGNOSIS — Z87.09 PERSONAL HISTORY OF OTHER DISEASES OF THE RESPIRATORY SYSTEM: ICD-10-CM

## 2018-02-27 DIAGNOSIS — M17.10 UNILATERAL PRIMARY OSTEOARTHRITIS, UNSPECIFIED KNEE: ICD-10-CM

## 2018-02-27 DIAGNOSIS — Z86.69 PERSONAL HISTORY OF OTHER DISEASES OF THE NERVOUS SYSTEM AND SENSE ORGANS: ICD-10-CM

## 2018-02-27 DIAGNOSIS — J45.991 COUGH VARIANT ASTHMA: ICD-10-CM

## 2018-02-27 DIAGNOSIS — N89.8 OTHER SPECIFIED NONINFLAMMATORY DISORDERS OF VAGINA: ICD-10-CM

## 2018-02-27 DIAGNOSIS — R68.83 CHILLS (WITHOUT FEVER): ICD-10-CM

## 2018-02-27 DIAGNOSIS — K62.89 OTHER SPECIFIED DISEASES OF ANUS AND RECTUM: ICD-10-CM

## 2018-02-27 DIAGNOSIS — M75.42 IMPINGEMENT SYNDROME OF RIGHT SHOULDER: ICD-10-CM

## 2018-02-27 DIAGNOSIS — Z86.39 PERSONAL HISTORY OF OTHER ENDOCRINE, NUTRITIONAL AND METABOLIC DISEASE: ICD-10-CM

## 2018-02-27 DIAGNOSIS — Z87.39 PERSONAL HISTORY OF OTHER DISEASES OF THE MUSCULOSKELETAL SYSTEM AND CONNECTIVE TISSUE: ICD-10-CM

## 2018-02-27 DIAGNOSIS — Z86.79 PERSONAL HISTORY OF OTHER DISEASES OF THE CIRCULATORY SYSTEM: ICD-10-CM

## 2018-02-27 DIAGNOSIS — J34.89 OTHER SPECIFIED DISORDERS OF NOSE AND NASAL SINUSES: ICD-10-CM

## 2018-02-27 DIAGNOSIS — J32.2 CHRONIC ETHMOIDAL SINUSITIS: ICD-10-CM

## 2018-02-27 DIAGNOSIS — Z00.00 ENCOUNTER FOR GENERAL ADULT MEDICAL EXAMINATION W/OUT ABNORMAL FINDINGS: ICD-10-CM

## 2018-02-27 DIAGNOSIS — M75.41 IMPINGEMENT SYNDROME OF RIGHT SHOULDER: ICD-10-CM

## 2018-02-27 DIAGNOSIS — M25.519 PAIN IN UNSPECIFIED SHOULDER: ICD-10-CM

## 2018-02-27 DIAGNOSIS — E04.9 NONTOXIC GOITER, UNSPECIFIED: ICD-10-CM

## 2018-02-27 DIAGNOSIS — Z01.419 ENCOUNTER FOR GYNECOLOGICAL EXAMINATION (GENERAL) (ROUTINE) W/OUT ABNORMAL FINDINGS: ICD-10-CM

## 2018-02-27 PROCEDURE — 99215 OFFICE O/P EST HI 40 MIN: CPT

## 2018-02-27 RX ORDER — ERGOCALCIFEROL 1.25 MG/1
1.25 MG CAPSULE, LIQUID FILLED ORAL
Qty: 4 | Refills: 0 | Status: DISCONTINUED | COMMUNITY
Start: 2017-02-13 | End: 2018-02-27

## 2018-02-28 ENCOUNTER — RX RENEWAL (OUTPATIENT)
Age: 48
End: 2018-02-28

## 2018-03-01 ENCOUNTER — OTHER (OUTPATIENT)
Age: 48
End: 2018-03-01

## 2018-03-01 PROBLEM — Z87.898 HISTORY OF PALPITATIONS: Status: RESOLVED | Noted: 2017-06-14 | Resolved: 2018-03-01

## 2018-03-01 PROBLEM — Z87.39 HISTORY OF JOINT PAIN: Status: RESOLVED | Noted: 2017-10-23 | Resolved: 2018-03-01

## 2018-03-01 PROBLEM — Z87.898 HISTORY OF NAUSEA: Status: RESOLVED | Noted: 2017-12-15 | Resolved: 2018-03-01

## 2018-03-01 LAB
BACTERIA UR CULT: NORMAL
BASOPHILS # BLD AUTO: 0.03 K/UL
BASOPHILS NFR BLD AUTO: 0.6 %
EOSINOPHIL # BLD AUTO: 0.11 K/UL
EOSINOPHIL NFR BLD AUTO: 2 %
HCT VFR BLD CALC: 40.8 %
HGB BLD-MCNC: 13.1 G/DL
IMM GRANULOCYTES NFR BLD AUTO: 0 %
LYMPHOCYTES # BLD AUTO: 1.45 K/UL
LYMPHOCYTES NFR BLD AUTO: 26.7 %
MAN DIFF?: NORMAL
MCHC RBC-ENTMCNC: 27.3 PG
MCHC RBC-ENTMCNC: 32.1 GM/DL
MCV RBC AUTO: 85.2 FL
MONOCYTES # BLD AUTO: 0.32 K/UL
MONOCYTES NFR BLD AUTO: 5.9 %
NEUTROPHILS # BLD AUTO: 3.52 K/UL
NEUTROPHILS NFR BLD AUTO: 64.8 %
PLATELET # BLD AUTO: 211 K/UL
RBC # BLD: 4.79 M/UL
RBC # FLD: 14.9 %
WBC # FLD AUTO: 5.43 K/UL

## 2018-03-08 ENCOUNTER — RX RENEWAL (OUTPATIENT)
Age: 48
End: 2018-03-08

## 2018-03-08 ENCOUNTER — MEDICATION RENEWAL (OUTPATIENT)
Age: 48
End: 2018-03-08

## 2018-03-16 ENCOUNTER — APPOINTMENT (OUTPATIENT)
Dept: INTERNAL MEDICINE | Facility: CLINIC | Age: 48
End: 2018-03-16

## 2018-04-05 ENCOUNTER — RX RENEWAL (OUTPATIENT)
Age: 48
End: 2018-04-05

## 2018-04-06 ENCOUNTER — APPOINTMENT (OUTPATIENT)
Dept: OBGYN | Facility: CLINIC | Age: 48
End: 2018-04-06
Payer: MEDICAID

## 2018-04-06 VITALS
DIASTOLIC BLOOD PRESSURE: 83 MMHG | HEART RATE: 83 BPM | HEIGHT: 67 IN | BODY MASS INDEX: 25.58 KG/M2 | SYSTOLIC BLOOD PRESSURE: 125 MMHG | WEIGHT: 163 LBS

## 2018-04-06 DIAGNOSIS — N80.9 ENDOMETRIOSIS, UNSPECIFIED: ICD-10-CM

## 2018-04-06 DIAGNOSIS — R50.9 FEVER, UNSPECIFIED: ICD-10-CM

## 2018-04-06 DIAGNOSIS — Z86.19 PERSONAL HISTORY OF OTHER INFECTIOUS AND PARASITIC DISEASES: ICD-10-CM

## 2018-04-06 PROCEDURE — 99214 OFFICE O/P EST MOD 30 MIN: CPT

## 2018-04-09 ENCOUNTER — RX RENEWAL (OUTPATIENT)
Age: 48
End: 2018-04-09

## 2018-04-09 RX ORDER — FLUTICASONE PROPIONATE 110 UG/1
110 AEROSOL, METERED RESPIRATORY (INHALATION) TWICE DAILY
Qty: 1 | Refills: 1 | Status: ACTIVE | COMMUNITY
Start: 2018-04-09 | End: 1900-01-01

## 2018-04-10 ENCOUNTER — APPOINTMENT (OUTPATIENT)
Dept: INTERNAL MEDICINE | Facility: CLINIC | Age: 48
End: 2018-04-10
Payer: MEDICAID

## 2018-04-10 ENCOUNTER — APPOINTMENT (OUTPATIENT)
Dept: CT IMAGING | Facility: CLINIC | Age: 48
End: 2018-04-10
Payer: MEDICAID

## 2018-04-10 ENCOUNTER — OUTPATIENT (OUTPATIENT)
Dept: OUTPATIENT SERVICES | Facility: HOSPITAL | Age: 48
LOS: 1 days | End: 2018-04-10
Payer: MEDICAID

## 2018-04-10 VITALS
TEMPERATURE: 98.8 F | DIASTOLIC BLOOD PRESSURE: 79 MMHG | SYSTOLIC BLOOD PRESSURE: 128 MMHG | RESPIRATION RATE: 16 BRPM | BODY MASS INDEX: 25.11 KG/M2 | HEIGHT: 67 IN | HEART RATE: 88 BPM | OXYGEN SATURATION: 99 % | WEIGHT: 160 LBS

## 2018-04-10 DIAGNOSIS — N63.20 UNSPECIFIED LUMP IN THE LEFT BREAST, UNSPECIFIED QUADRANT: Chronic | ICD-10-CM

## 2018-04-10 DIAGNOSIS — N80.9 ENDOMETRIOSIS, UNSPECIFIED: ICD-10-CM

## 2018-04-10 DIAGNOSIS — R52 PAIN, UNSPECIFIED: Chronic | ICD-10-CM

## 2018-04-10 DIAGNOSIS — R59.0 LOCALIZED ENLARGED LYMPH NODES: Chronic | ICD-10-CM

## 2018-04-10 DIAGNOSIS — Z00.8 ENCOUNTER FOR OTHER GENERAL EXAMINATION: ICD-10-CM

## 2018-04-10 PROCEDURE — 74177 CT ABD & PELVIS W/CONTRAST: CPT

## 2018-04-10 PROCEDURE — 99214 OFFICE O/P EST MOD 30 MIN: CPT

## 2018-04-10 PROCEDURE — 74177 CT ABD & PELVIS W/CONTRAST: CPT | Mod: 26

## 2018-04-10 RX ORDER — FLUNISOLIDE 80 UG/1
80 AEROSOL, METERED RESPIRATORY (INHALATION)
Qty: 1 | Refills: 1 | Status: DISCONTINUED | COMMUNITY
Start: 2017-10-11 | End: 2018-04-10

## 2018-04-10 RX ORDER — OXYCODONE 5 MG/1
5 TABLET ORAL
Qty: 1 | Refills: 0 | Status: DISCONTINUED | COMMUNITY
Start: 2018-02-27 | End: 2018-04-10

## 2018-04-10 RX ORDER — TERCONAZOLE 8 MG/G
0.8 CREAM VAGINAL
Qty: 3 | Refills: 0 | Status: DISCONTINUED | COMMUNITY
Start: 2018-02-09 | End: 2018-04-10

## 2018-04-11 LAB
ALBUMIN SERPL ELPH-MCNC: 4.6 G/DL
ALP BLD-CCNC: 62 U/L
ALT SERPL-CCNC: 18 U/L
ANION GAP SERPL CALC-SCNC: 14 MMOL/L
AST SERPL-CCNC: 19 U/L
BASOPHILS # BLD AUTO: 0.03 K/UL
BASOPHILS NFR BLD AUTO: 0.5 %
BILIRUB SERPL-MCNC: 0.2 MG/DL
BUN SERPL-MCNC: 13 MG/DL
CALCIUM SERPL-MCNC: 9.8 MG/DL
CHLORIDE SERPL-SCNC: 103 MMOL/L
CO2 SERPL-SCNC: 23 MMOL/L
CREAT SERPL-MCNC: 0.83 MG/DL
EOSINOPHIL # BLD AUTO: 0.09 K/UL
EOSINOPHIL NFR BLD AUTO: 1.5 %
GLUCOSE SERPL-MCNC: 90 MG/DL
HCT VFR BLD CALC: 45.1 %
HGB BLD-MCNC: 14.5 G/DL
IMM GRANULOCYTES NFR BLD AUTO: 0.3 %
LYMPHOCYTES # BLD AUTO: 1.66 K/UL
LYMPHOCYTES NFR BLD AUTO: 27.9 %
MAN DIFF?: NORMAL
MCHC RBC-ENTMCNC: 27.7 PG
MCHC RBC-ENTMCNC: 32.2 GM/DL
MCV RBC AUTO: 86.2 FL
MONOCYTES # BLD AUTO: 0.36 K/UL
MONOCYTES NFR BLD AUTO: 6.1 %
NEUTROPHILS # BLD AUTO: 3.79 K/UL
NEUTROPHILS NFR BLD AUTO: 63.7 %
PLATELET # BLD AUTO: 216 K/UL
POTASSIUM SERPL-SCNC: 5 MMOL/L
PROT SERPL-MCNC: 7.5 G/DL
RBC # BLD: 5.23 M/UL
RBC # FLD: 15.6 %
SODIUM SERPL-SCNC: 140 MMOL/L
WBC # FLD AUTO: 5.95 K/UL

## 2018-04-12 ENCOUNTER — APPOINTMENT (OUTPATIENT)
Dept: TRAUMA SURGERY | Facility: CLINIC | Age: 48
End: 2018-04-12
Payer: MEDICAID

## 2018-04-12 VITALS
HEART RATE: 84 BPM | SYSTOLIC BLOOD PRESSURE: 133 MMHG | TEMPERATURE: 97.6 F | WEIGHT: 160 LBS | BODY MASS INDEX: 25.11 KG/M2 | HEIGHT: 67 IN | DIASTOLIC BLOOD PRESSURE: 88 MMHG

## 2018-04-12 LAB
T4 FREE SERPL-MCNC: 1.6 NG/DL
TSH SERPL-ACNC: 1.81 UIU/ML

## 2018-04-12 PROCEDURE — XXXXX: CPT

## 2018-04-15 ENCOUNTER — TRANSCRIPTION ENCOUNTER (OUTPATIENT)
Age: 48
End: 2018-04-15

## 2018-04-23 ENCOUNTER — OUTPATIENT (OUTPATIENT)
Dept: OUTPATIENT SERVICES | Facility: HOSPITAL | Age: 48
LOS: 1 days | End: 2018-04-23

## 2018-04-23 VITALS
SYSTOLIC BLOOD PRESSURE: 118 MMHG | DIASTOLIC BLOOD PRESSURE: 76 MMHG | TEMPERATURE: 99 F | HEIGHT: 66.5 IN | HEART RATE: 76 BPM | WEIGHT: 160.06 LBS | RESPIRATION RATE: 16 BRPM

## 2018-04-23 DIAGNOSIS — R10.2 PELVIC AND PERINEAL PAIN: ICD-10-CM

## 2018-04-23 DIAGNOSIS — N80.9 ENDOMETRIOSIS, UNSPECIFIED: ICD-10-CM

## 2018-04-23 DIAGNOSIS — N63.20 UNSPECIFIED LUMP IN THE LEFT BREAST, UNSPECIFIED QUADRANT: Chronic | ICD-10-CM

## 2018-04-23 DIAGNOSIS — R59.0 LOCALIZED ENLARGED LYMPH NODES: Chronic | ICD-10-CM

## 2018-04-23 DIAGNOSIS — R52 PAIN, UNSPECIFIED: Chronic | ICD-10-CM

## 2018-04-23 LAB
APPEARANCE UR: CLEAR — SIGNIFICANT CHANGE UP
BACTERIA # UR AUTO: SIGNIFICANT CHANGE UP
BILIRUB UR-MCNC: NEGATIVE — SIGNIFICANT CHANGE UP
BLD GP AB SCN SERPL QL: NEGATIVE — SIGNIFICANT CHANGE UP
BLOOD UR QL VISUAL: NEGATIVE — SIGNIFICANT CHANGE UP
BUN SERPL-MCNC: 16 MG/DL — SIGNIFICANT CHANGE UP (ref 7–23)
CALCIUM SERPL-MCNC: 9.1 MG/DL — SIGNIFICANT CHANGE UP (ref 8.4–10.5)
CHLORIDE SERPL-SCNC: 102 MMOL/L — SIGNIFICANT CHANGE UP (ref 98–107)
CO2 SERPL-SCNC: 24 MMOL/L — SIGNIFICANT CHANGE UP (ref 22–31)
COLOR SPEC: SIGNIFICANT CHANGE UP
CREAT SERPL-MCNC: 0.73 MG/DL — SIGNIFICANT CHANGE UP (ref 0.5–1.3)
GLUCOSE SERPL-MCNC: 87 MG/DL — SIGNIFICANT CHANGE UP (ref 70–99)
GLUCOSE UR-MCNC: NEGATIVE — SIGNIFICANT CHANGE UP
HBA1C BLD-MCNC: 5 % — SIGNIFICANT CHANGE UP (ref 4–5.6)
HCG SERPL-ACNC: < 5 MIU/ML — SIGNIFICANT CHANGE UP
HCT VFR BLD CALC: 46.2 % — HIGH (ref 34.5–45)
HGB BLD-MCNC: 14.5 G/DL — SIGNIFICANT CHANGE UP (ref 11.5–15.5)
HIV1 AG SER QL: SIGNIFICANT CHANGE UP
HIV1+2 AB SPEC QL: SIGNIFICANT CHANGE UP
KETONES UR-MCNC: NEGATIVE — SIGNIFICANT CHANGE UP
LEUKOCYTE ESTERASE UR-ACNC: NEGATIVE — SIGNIFICANT CHANGE UP
MCHC RBC-ENTMCNC: 27.5 PG — SIGNIFICANT CHANGE UP (ref 27–34)
MCHC RBC-ENTMCNC: 31.4 % — LOW (ref 32–36)
MCV RBC AUTO: 87.5 FL — SIGNIFICANT CHANGE UP (ref 80–100)
NITRITE UR-MCNC: NEGATIVE — SIGNIFICANT CHANGE UP
NRBC # FLD: 0 — SIGNIFICANT CHANGE UP
PH UR: 6.5 — SIGNIFICANT CHANGE UP (ref 4.6–8)
PLATELET # BLD AUTO: 217 K/UL — SIGNIFICANT CHANGE UP (ref 150–400)
PMV BLD: 11.6 FL — SIGNIFICANT CHANGE UP (ref 7–13)
POTASSIUM SERPL-MCNC: 4.3 MMOL/L — SIGNIFICANT CHANGE UP (ref 3.5–5.3)
POTASSIUM SERPL-SCNC: 4.3 MMOL/L — SIGNIFICANT CHANGE UP (ref 3.5–5.3)
PROT UR-MCNC: NEGATIVE MG/DL — SIGNIFICANT CHANGE UP
RBC # BLD: 5.28 M/UL — HIGH (ref 3.8–5.2)
RBC # FLD: 14.3 % — SIGNIFICANT CHANGE UP (ref 10.3–14.5)
RBC CASTS # UR COMP ASSIST: SIGNIFICANT CHANGE UP (ref 0–?)
RH IG SCN BLD-IMP: POSITIVE — SIGNIFICANT CHANGE UP
SODIUM SERPL-SCNC: 140 MMOL/L — SIGNIFICANT CHANGE UP (ref 135–145)
SP GR SPEC: 1.01 — SIGNIFICANT CHANGE UP (ref 1–1.04)
SQUAMOUS # UR AUTO: SIGNIFICANT CHANGE UP
UROBILINOGEN FLD QL: NORMAL MG/DL — SIGNIFICANT CHANGE UP
WBC # BLD: 5.6 K/UL — SIGNIFICANT CHANGE UP (ref 3.8–10.5)
WBC # FLD AUTO: 5.6 K/UL — SIGNIFICANT CHANGE UP (ref 3.8–10.5)
WBC UR QL: SIGNIFICANT CHANGE UP (ref 0–?)

## 2018-04-23 RX ORDER — ALBUTEROL 90 UG/1
0 AEROSOL, METERED ORAL
Qty: 0 | Refills: 0 | COMMUNITY

## 2018-04-23 RX ORDER — SODIUM CHLORIDE 9 MG/ML
1000 INJECTION, SOLUTION INTRAVENOUS
Qty: 0 | Refills: 0 | Status: DISCONTINUED | OUTPATIENT
Start: 2018-05-14 | End: 2018-05-29

## 2018-04-23 RX ORDER — SERTRALINE 25 MG/1
200 TABLET, FILM COATED ORAL
Qty: 0 | Refills: 0 | COMMUNITY

## 2018-04-23 NOTE — H&P PST ADULT - FAMILY HISTORY
Father  Still living? Yes, Estimated age: 75  Family history of hypertension, Age at diagnosis: Age Unknown

## 2018-04-23 NOTE — H&P PST ADULT - ASSESSMENT
Pt. is a 48 yo female with endometriosis.     Pt. had palpitations in 2017.  Pt. was evaluated by a Cardiologist 6/14/17 and wore a 24 hour Holter monitor.  Requested and put in the chart the last cardiac note and Holter monitor results.

## 2018-04-23 NOTE — H&P PST ADULT - PROBLEM SELECTOR PLAN 1
Pt. is scheduled for a total laparoscopic hysterectomy, right salpingo oophorectomy, left salpingectomy and cystoscopy 4/27/18.

## 2018-04-23 NOTE — H&P PST ADULT - PMH
Anemia    Asthma  last rescue inhaler use "yesterday", 4/22/2018  Blepharitis of eyelid of left eye    Depression with anxiety    Environmental allergies    Excessive menstruation    GERD (gastroesophageal reflux disease)    Hypothyroid    Migraines    MVP (mitral valve prolapse)    Ovarian cyst  Dec. 2017  Pneumonia  2015  Right thyroid nodule    Sinus infection  completed antibiotic -- clindamyacin today, 12-21-17 Anemia    Asthma  last rescue inhaler use "yesterday", 4/22/2018  Blepharitis of eyelid of left eye    Depression with anxiety    Environmental allergies    Excessive menstruation    GERD (gastroesophageal reflux disease)    Hemorrhoids    History of palpitations  with anxiety "and when I have too much coffee"  Hypothyroid    Migraines    MVP (mitral valve prolapse)    Ovarian cyst  Dec. 2017  Pneumonia  2015  Right thyroid nodule    Sinus infection  completed antibiotic -- clindamyacin today, 12-21-17 Anemia    Asthma  last rescue inhaler use "yesterday", 4/22/2018  Blepharitis of eyelid of left eye    Depression with anxiety    Endometriosis    Environmental allergies    Excessive menstruation    GERD (gastroesophageal reflux disease)    Hemorrhoids    History of palpitations  with anxiety "and when I have too much coffee"  Hypothyroid    Migraines    MVP (mitral valve prolapse)    Ovarian cyst  Dec. 2017  Pneumonia  2015  Right thyroid nodule    Sinus infection  completed antibiotic -- clindamyacin today, 12-21-17

## 2018-04-23 NOTE — H&P PST ADULT - ANESTHESIA, PREVIOUS REACTION, PROFILE
none "it was moderate nausea but they always give me something so I now never have a problem", denies family hx

## 2018-04-23 NOTE — H&P PST ADULT - PRO PAIN LIFE ADAPT
decreased appetite/decreased activity level/inability to sleep "my mood"/inability to sleep/decreased activity level/decreased appetite

## 2018-04-23 NOTE — H&P PST ADULT - NS PRO LAST MENSTRUAL DATE
October 10, 2017 -- sent [facility] and given urine cup to bring specimen the am of surgery 12/2017

## 2018-04-24 LAB
BACTERIA UR CULT: SIGNIFICANT CHANGE UP
SPECIMEN SOURCE: SIGNIFICANT CHANGE UP

## 2018-04-27 ENCOUNTER — APPOINTMENT (OUTPATIENT)
Dept: OBGYN | Facility: HOSPITAL | Age: 48
End: 2018-04-27

## 2018-04-30 PROBLEM — R50.9 FEVER IN ADULT: Status: RESOLVED | Noted: 2018-02-27 | Resolved: 2018-04-30

## 2018-05-04 ENCOUNTER — APPOINTMENT (OUTPATIENT)
Dept: OBGYN | Facility: CLINIC | Age: 48
End: 2018-05-04

## 2018-05-09 ENCOUNTER — RX RENEWAL (OUTPATIENT)
Age: 48
End: 2018-05-09

## 2018-05-11 NOTE — ASU PATIENT PROFILE, ADULT - ANESTHESIA, PREVIOUS REACTION, PROFILE
"it was moderate nausea but they always give me something so I now never have a problem", denies family hx

## 2018-05-11 NOTE — ASU PATIENT PROFILE, ADULT - PMH
Anemia    Asthma  last rescue inhaler use "yesterday", 4/22/2018  Blepharitis of eyelid of left eye    Depression with anxiety    Endometriosis    Environmental allergies    Excessive menstruation    GERD (gastroesophageal reflux disease)    Hemorrhoids    History of palpitations  with anxiety "and when I have too much coffee"  Hypothyroid    Migraines    MVP (mitral valve prolapse)    Ovarian cyst  Dec. 2017  Pneumonia  2015  Right thyroid nodule    Sinus infection  completed antibiotic -- clindamyacin today, 12-21-17

## 2018-05-13 ENCOUNTER — TRANSCRIPTION ENCOUNTER (OUTPATIENT)
Age: 48
End: 2018-05-13

## 2018-05-14 ENCOUNTER — RESULT REVIEW (OUTPATIENT)
Age: 48
End: 2018-05-14

## 2018-05-14 ENCOUNTER — APPOINTMENT (OUTPATIENT)
Dept: TRAUMA SURGERY | Facility: HOSPITAL | Age: 48
End: 2018-05-14

## 2018-05-14 ENCOUNTER — APPOINTMENT (OUTPATIENT)
Dept: OBGYN | Facility: HOSPITAL | Age: 48
End: 2018-05-14

## 2018-05-14 ENCOUNTER — OUTPATIENT (OUTPATIENT)
Dept: OUTPATIENT SERVICES | Facility: HOSPITAL | Age: 48
LOS: 1 days | Discharge: HOME | End: 2018-05-14
Payer: MEDICAID

## 2018-05-14 VITALS
HEART RATE: 82 BPM | OXYGEN SATURATION: 97 % | SYSTOLIC BLOOD PRESSURE: 124 MMHG | RESPIRATION RATE: 17 BRPM | DIASTOLIC BLOOD PRESSURE: 75 MMHG

## 2018-05-14 VITALS
HEIGHT: 66.5 IN | OXYGEN SATURATION: 98 % | RESPIRATION RATE: 17 BRPM | TEMPERATURE: 99 F | HEART RATE: 17 BPM | SYSTOLIC BLOOD PRESSURE: 137 MMHG | WEIGHT: 160.06 LBS | DIASTOLIC BLOOD PRESSURE: 70 MMHG

## 2018-05-14 DIAGNOSIS — R52 PAIN, UNSPECIFIED: Chronic | ICD-10-CM

## 2018-05-14 DIAGNOSIS — R59.0 LOCALIZED ENLARGED LYMPH NODES: Chronic | ICD-10-CM

## 2018-05-14 DIAGNOSIS — N63.20 UNSPECIFIED LUMP IN THE LEFT BREAST, UNSPECIFIED QUADRANT: Chronic | ICD-10-CM

## 2018-05-14 DIAGNOSIS — Z98.890 OTHER SPECIFIED POSTPROCEDURAL STATES: ICD-10-CM

## 2018-05-14 DIAGNOSIS — R10.2 PELVIC AND PERINEAL PAIN: ICD-10-CM

## 2018-05-14 LAB
GLUCOSE BLDC GLUCOMTR-MCNC: 85 MG/DL — SIGNIFICANT CHANGE UP (ref 70–99)
HCG UR QL: NEGATIVE — SIGNIFICANT CHANGE UP

## 2018-05-14 PROCEDURE — 88307 TISSUE EXAM BY PATHOLOGIST: CPT | Mod: 26

## 2018-05-14 PROCEDURE — 58571 TLH W/T/O 250 G OR LESS: CPT | Mod: 80

## 2018-05-14 PROCEDURE — 58571 TLH W/T/O 250 G OR LESS: CPT

## 2018-05-14 PROCEDURE — 44180 LAP ENTEROLYSIS: CPT | Mod: 62

## 2018-05-14 PROCEDURE — 52000 CYSTOURETHROSCOPY: CPT | Mod: 59

## 2018-05-14 RX ORDER — ALBUTEROL 90 UG/1
2 AEROSOL, METERED ORAL
Qty: 0 | Refills: 0 | COMMUNITY

## 2018-05-14 RX ORDER — SODIUM CHLORIDE 9 MG/ML
1000 INJECTION, SOLUTION INTRAVENOUS
Qty: 0 | Refills: 0 | Status: DISCONTINUED | OUTPATIENT
Start: 2018-05-14 | End: 2018-05-29

## 2018-05-14 RX ORDER — ONDANSETRON 8 MG/1
4 TABLET, FILM COATED ORAL ONCE
Qty: 0 | Refills: 0 | Status: DISCONTINUED | OUTPATIENT
Start: 2018-05-14 | End: 2018-05-29

## 2018-05-14 RX ORDER — HEPARIN SODIUM 5000 [USP'U]/ML
5000 INJECTION INTRAVENOUS; SUBCUTANEOUS ONCE
Qty: 0 | Refills: 0 | Status: COMPLETED | OUTPATIENT
Start: 2018-05-14 | End: 2018-05-14

## 2018-05-14 RX ORDER — FENTANYL CITRATE 50 UG/ML
25 INJECTION INTRAVENOUS
Qty: 0 | Refills: 0 | Status: DISCONTINUED | OUTPATIENT
Start: 2018-05-14 | End: 2018-05-14

## 2018-05-14 RX ORDER — OXYCODONE HYDROCHLORIDE 5 MG/1
5 TABLET ORAL ONCE
Qty: 0 | Refills: 0 | Status: DISCONTINUED | OUTPATIENT
Start: 2018-05-14 | End: 2018-05-14

## 2018-05-14 RX ORDER — FEXOFENADINE HCL 30 MG
1 TABLET ORAL
Qty: 0 | Refills: 0 | COMMUNITY

## 2018-05-14 RX ORDER — OXYCODONE HYDROCHLORIDE 5 MG/1
1 TABLET ORAL
Qty: 10 | Refills: 0 | OUTPATIENT
Start: 2018-05-14

## 2018-05-14 RX ORDER — TRIAMCINOLONE ACETONIDE 55 MCG
0 AEROSOL, SPRAY (GRAM) NASAL
Qty: 0 | Refills: 0 | COMMUNITY

## 2018-05-14 RX ORDER — ACETAMINOPHEN 500 MG
1000 TABLET ORAL ONCE
Qty: 0 | Refills: 0 | Status: COMPLETED | OUTPATIENT
Start: 2018-05-14 | End: 2018-05-14

## 2018-05-14 RX ORDER — SERTRALINE 25 MG/1
200 TABLET, FILM COATED ORAL
Qty: 0 | Refills: 0 | COMMUNITY

## 2018-05-14 RX ORDER — OMEPRAZOLE 10 MG/1
1 CAPSULE, DELAYED RELEASE ORAL
Qty: 0 | Refills: 0 | COMMUNITY

## 2018-05-14 RX ADMIN — SODIUM CHLORIDE 125 MILLILITER(S): 9 INJECTION, SOLUTION INTRAVENOUS at 19:35

## 2018-05-14 RX ADMIN — OXYCODONE HYDROCHLORIDE 5 MILLIGRAM(S): 5 TABLET ORAL at 21:56

## 2018-05-14 RX ADMIN — FENTANYL CITRATE 25 MICROGRAM(S): 50 INJECTION INTRAVENOUS at 19:45

## 2018-05-14 RX ADMIN — OXYCODONE HYDROCHLORIDE 5 MILLIGRAM(S): 5 TABLET ORAL at 22:34

## 2018-05-14 RX ADMIN — FENTANYL CITRATE 25 MICROGRAM(S): 50 INJECTION INTRAVENOUS at 19:30

## 2018-05-14 RX ADMIN — FENTANYL CITRATE 25 MICROGRAM(S): 50 INJECTION INTRAVENOUS at 20:00

## 2018-05-14 RX ADMIN — Medication 1000 MILLIGRAM(S): at 20:50

## 2018-05-14 RX ADMIN — FENTANYL CITRATE 25 MICROGRAM(S): 50 INJECTION INTRAVENOUS at 21:09

## 2018-05-14 RX ADMIN — HEPARIN SODIUM 5000 UNIT(S): 5000 INJECTION INTRAVENOUS; SUBCUTANEOUS at 14:59

## 2018-05-14 RX ADMIN — Medication 400 MILLIGRAM(S): at 20:20

## 2018-05-14 RX ADMIN — FENTANYL CITRATE 25 MICROGRAM(S): 50 INJECTION INTRAVENOUS at 20:26

## 2018-05-14 RX ADMIN — FENTANYL CITRATE 25 MICROGRAM(S): 50 INJECTION INTRAVENOUS at 21:25

## 2018-05-14 NOTE — PROGRESS NOTE ADULT - SUBJECTIVE AND OBJECTIVE BOX
Patient seen and examined at bedside, recently post-op. No acute complaints at this time. Pain well controlled. Denies CP, SOB, N/V, fevers, and chills.    Vital Signs Last 24 Hours  T(C): 37 (05-14-18 @ 18:50), Max: 37 (05-14-18 @ 13:54)  HR: 82 (05-14-18 @ 22:15) (17 - 96)  BP: 124/75 (05-14-18 @ 22:15) (105/77 - 141/75)  RR: 17 (05-14-18 @ 22:15) (12 - 19)  SpO2: 97% (05-14-18 @ 22:15) (96% - 98%)    I&O's Summary    14 May 2018 07:01  -  14 May 2018 22:32  --------------------------------------------------------  IN: 575 mL / OUT: 400 mL / NET: 175 mL    Physical Exam:  General: NAD  CV: NR, RR, S1, S2, no M/R/G  Lungs: CTA-B  Abdomen: Soft, appropriately tender, non-distended, +BS  Incision: Port sites CDI  Ext: No pain or swelling

## 2018-05-14 NOTE — ASU DISCHARGE PLAN (ADULT/PEDIATRIC). - MEDICATION SUMMARY - MEDICATIONS TO TAKE
I will START or STAY ON the medications listed below when I get home from the hospital:    Tylenol 325 mg oral tablet  -- 2 tab(s) by mouth every 4 hours  -- Indication: For Pain    Motrin 600 mg oral tablet  -- 1 tab(s) by mouth every 6 hours  -- Indication: For Pain    oxyCODONE 5 mg oral tablet  -- 1 tab(s) by mouth every 6 hours MDD:4 tabs prn severe pain  -- Caution federal law prohibits the transfer of this drug to any person other  than the person for whom it was prescribed.  It is very important that you take or use this exactly as directed.  Do not skip doses or discontinue unless directed by your doctor.  May cause drowsiness.  Alcohol may intensify this effect.  Use care when operating dangerous machinery.  This prescription cannot be refilled.  Using more of this medication than prescribed may cause serious breathing problems.    -- Indication: For Pain

## 2018-05-14 NOTE — ASU DISCHARGE PLAN (ADULT/PEDIATRIC). - NURSING INSTRUCTIONS
You received Tylenol at 8:30pm. DO not take any medications containing Tylenol until 2:30 am on 5/15  You received oxycodone at 10:00 pm do not take any medications containing oxycodone until 4:00 am on 5/15

## 2018-05-14 NOTE — ASU DISCHARGE PLAN (ADULT/PEDIATRIC). - NOTIFY
Pain not relieved by Medications/Increased Irritability or Sluggishness/Bleeding that does not stop/Persistent Nausea and Vomiting/GYN Fever>100.4/Inability to Tolerate Liquids or Foods

## 2018-05-14 NOTE — PROGRESS NOTE ADULT - PROBLEM SELECTOR PLAN 1
Neuro: c/w po pain meds  CV: Hemodynamically stable  Pulm: Saturating well on room air, encourage incentive spirometry  GI: c/w regular diet  : voiding spontaneously  Heme: c/w SCDs for DVT ppx  Dispo: Clear for discharge home    Bossman Henderson, PGY-2  Pager #[phone]

## 2018-05-14 NOTE — BRIEF OPERATIVE NOTE - OPERATION/FINDINGS
approx 10 week uterus, right ovary contained with endometriosis, endometriotic implants on bilateral ureterosacrals, loop of small bowel adherent to right adenxa, cystoscopy performed, bilateral UOs visualized

## 2018-05-29 ENCOUNTER — APPOINTMENT (OUTPATIENT)
Dept: OBGYN | Facility: CLINIC | Age: 48
End: 2018-05-29

## 2018-05-29 VITALS
SYSTOLIC BLOOD PRESSURE: 121 MMHG | WEIGHT: 165.25 LBS | DIASTOLIC BLOOD PRESSURE: 79 MMHG | BODY MASS INDEX: 25.94 KG/M2 | TEMPERATURE: 98.3 F | HEIGHT: 67 IN | HEART RATE: 80 BPM

## 2018-05-29 DIAGNOSIS — R35.0 FREQUENCY OF MICTURITION: ICD-10-CM

## 2018-05-29 DIAGNOSIS — N70.93 SALPINGITIS AND OOPHORITIS, UNSPECIFIED: ICD-10-CM

## 2018-05-31 ENCOUNTER — OTHER (OUTPATIENT)
Age: 48
End: 2018-05-31

## 2018-05-31 LAB — BACTERIA UR CULT: NORMAL

## 2018-06-15 ENCOUNTER — OTHER (OUTPATIENT)
Age: 48
End: 2018-06-15

## 2018-06-26 PROBLEM — N70.93 TOA (TUBO-OVARIAN ABSCESS): Status: RESOLVED | Noted: 2018-04-06 | Resolved: 2018-06-26

## 2018-06-27 ENCOUNTER — LABORATORY RESULT (OUTPATIENT)
Age: 48
End: 2018-06-27

## 2018-06-27 ENCOUNTER — APPOINTMENT (OUTPATIENT)
Dept: INTERNAL MEDICINE | Facility: CLINIC | Age: 48
End: 2018-06-27
Payer: MEDICAID

## 2018-06-27 VITALS
HEART RATE: 65 BPM | RESPIRATION RATE: 17 BRPM | BODY MASS INDEX: 25.58 KG/M2 | HEIGHT: 67 IN | OXYGEN SATURATION: 99 % | TEMPERATURE: 98.6 F | WEIGHT: 163 LBS | DIASTOLIC BLOOD PRESSURE: 80 MMHG | SYSTOLIC BLOOD PRESSURE: 116 MMHG

## 2018-06-27 PROCEDURE — 99396 PREV VISIT EST AGE 40-64: CPT

## 2018-06-27 RX ORDER — ONDANSETRON HYDROCHLORIDE 4 MG/1
4 TABLET, FILM COATED ORAL EVERY 4 HOURS
Qty: 1 | Refills: 2 | Status: DISCONTINUED | COMMUNITY
Start: 2018-05-15 | End: 2018-06-27

## 2018-06-27 RX ORDER — NORETHINDRONE ACETATE AND ETHINYL ESTRADIOL, ETHINYL ESTRADIOL AND FERROUS FUMARATE 1MG-10(24)
1 MG-10 MCG / KIT ORAL DAILY
Qty: 1 | Refills: 5 | Status: DISCONTINUED | COMMUNITY
Start: 2018-01-26 | End: 2018-06-27

## 2018-06-27 RX ORDER — NAPROXEN 500 MG/1
500 TABLET ORAL
Qty: 1 | Refills: 1 | Status: DISCONTINUED | COMMUNITY
Start: 2018-02-27 | End: 2018-06-27

## 2018-06-27 RX ORDER — ONDANSETRON 8 MG/1
8 TABLET, ORALLY DISINTEGRATING ORAL
Qty: 60 | Refills: 2 | Status: DISCONTINUED | COMMUNITY
Start: 2017-12-15 | End: 2018-06-27

## 2018-06-28 ENCOUNTER — RX RENEWAL (OUTPATIENT)
Age: 48
End: 2018-06-28

## 2018-06-29 LAB
25(OH)D3 SERPL-MCNC: 46.1 NG/ML
ALBUMIN SERPL ELPH-MCNC: 4.6 G/DL
ALP BLD-CCNC: 68 U/L
ALT SERPL-CCNC: 17 U/L
ANION GAP SERPL CALC-SCNC: 13 MMOL/L
APPEARANCE: CLEAR
AST SERPL-CCNC: 22 U/L
B BURGDOR IGG+IGM SER QL IB: NORMAL
BASOPHILS # BLD AUTO: 0.02 K/UL
BASOPHILS NFR BLD AUTO: 0.5 %
BILIRUB SERPL-MCNC: 0.3 MG/DL
BILIRUBIN URINE: NEGATIVE
BLOOD URINE: NEGATIVE
BUN SERPL-MCNC: 12 MG/DL
CALCIUM SERPL-MCNC: 9.4 MG/DL
CHLORIDE SERPL-SCNC: 104 MMOL/L
CHOLEST SERPL-MCNC: 209 MG/DL
CHOLEST/HDLC SERPL: 2.9 RATIO
CO2 SERPL-SCNC: 26 MMOL/L
COLOR: YELLOW
CREAT SERPL-MCNC: 0.76 MG/DL
EOSINOPHIL # BLD AUTO: 0.15 K/UL
EOSINOPHIL NFR BLD AUTO: 3.7 %
GLUCOSE QUALITATIVE U: NEGATIVE MG/DL
GLUCOSE SERPL-MCNC: 91 MG/DL
HBA1C MFR BLD HPLC: 5.2 %
HCT VFR BLD CALC: 45 %
HDLC SERPL-MCNC: 73 MG/DL
HGB BLD-MCNC: 14.3 G/DL
IMM GRANULOCYTES NFR BLD AUTO: 0 %
KETONES URINE: NEGATIVE
LDLC SERPL CALC-MCNC: 112 MG/DL
LEUKOCYTE ESTERASE URINE: ABNORMAL
LYMPHOCYTES # BLD AUTO: 1.35 K/UL
LYMPHOCYTES NFR BLD AUTO: 33.2 %
MAN DIFF?: NORMAL
MCHC RBC-ENTMCNC: 28 PG
MCHC RBC-ENTMCNC: 31.8 GM/DL
MCV RBC AUTO: 88.1 FL
MONOCYTES # BLD AUTO: 0.28 K/UL
MONOCYTES NFR BLD AUTO: 6.9 %
NEUTROPHILS # BLD AUTO: 2.27 K/UL
NEUTROPHILS NFR BLD AUTO: 55.7 %
NITRITE URINE: NEGATIVE
PH URINE: 7.5
PLATELET # BLD AUTO: 210 K/UL
POTASSIUM SERPL-SCNC: 4.8 MMOL/L
PROT SERPL-MCNC: 7.3 G/DL
PROTEIN URINE: NEGATIVE MG/DL
RBC # BLD: 5.11 M/UL
RBC # FLD: 14.5 %
SODIUM SERPL-SCNC: 143 MMOL/L
SPECIFIC GRAVITY URINE: 1.01
TRIGL SERPL-MCNC: 119 MG/DL
TSH SERPL-ACNC: 1.62 UIU/ML
UROBILINOGEN URINE: NEGATIVE MG/DL
WBC # FLD AUTO: 4.07 K/UL

## 2018-07-01 PROBLEM — Z98.890 POSTOPERATIVE STATE: Status: RESOLVED | Noted: 2018-05-15 | Resolved: 2018-07-01

## 2018-07-02 ENCOUNTER — APPOINTMENT (OUTPATIENT)
Dept: OBGYN | Facility: CLINIC | Age: 48
End: 2018-07-02

## 2018-07-02 DIAGNOSIS — R10.2 PELVIC AND PERINEAL PAIN: ICD-10-CM

## 2018-07-02 DIAGNOSIS — Z98.890 OTHER SPECIFIED POSTPROCEDURAL STATES: ICD-10-CM

## 2018-07-11 DIAGNOSIS — R92.2 INCONCLUSIVE MAMMOGRAM: ICD-10-CM

## 2018-07-12 ENCOUNTER — OUTPATIENT (OUTPATIENT)
Dept: OUTPATIENT SERVICES | Facility: HOSPITAL | Age: 48
LOS: 1 days | End: 2018-07-12
Payer: MEDICAID

## 2018-07-12 ENCOUNTER — APPOINTMENT (OUTPATIENT)
Dept: MAMMOGRAPHY | Facility: CLINIC | Age: 48
End: 2018-07-12
Payer: MEDICAID

## 2018-07-12 DIAGNOSIS — N63.20 UNSPECIFIED LUMP IN THE LEFT BREAST, UNSPECIFIED QUADRANT: Chronic | ICD-10-CM

## 2018-07-12 DIAGNOSIS — Z12.31 ENCOUNTER FOR SCREENING MAMMOGRAM FOR MALIGNANT NEOPLASM OF BREAST: ICD-10-CM

## 2018-07-12 DIAGNOSIS — R59.0 LOCALIZED ENLARGED LYMPH NODES: Chronic | ICD-10-CM

## 2018-07-12 DIAGNOSIS — R52 PAIN, UNSPECIFIED: Chronic | ICD-10-CM

## 2018-07-12 PROCEDURE — 77063 BREAST TOMOSYNTHESIS BI: CPT

## 2018-07-12 PROCEDURE — 77063 BREAST TOMOSYNTHESIS BI: CPT | Mod: 26

## 2018-07-12 PROCEDURE — 77067 SCR MAMMO BI INCL CAD: CPT | Mod: 26

## 2018-07-12 PROCEDURE — 77067 SCR MAMMO BI INCL CAD: CPT

## 2018-07-13 PROBLEM — R92.2 DENSE BREASTS: Status: ACTIVE | Noted: 2018-07-13

## 2018-07-26 PROBLEM — R10.2 SUPRAPUBIC DISCOMFORT: Status: RESOLVED | Noted: 2017-03-21 | Resolved: 2018-07-26

## 2018-08-01 ENCOUNTER — RX RENEWAL (OUTPATIENT)
Age: 48
End: 2018-08-01

## 2018-08-15 ENCOUNTER — CHART COPY (OUTPATIENT)
Age: 48
End: 2018-08-15

## 2018-09-07 ENCOUNTER — MEDICATION RENEWAL (OUTPATIENT)
Age: 48
End: 2018-09-07

## 2018-09-07 PROBLEM — G43.909 MIGRAINE, UNSPECIFIED, NOT INTRACTABLE, WITHOUT STATUS MIGRAINOSUS: Chronic | Status: ACTIVE | Noted: 2017-12-21

## 2018-09-07 PROBLEM — Z87.898 PERSONAL HISTORY OF OTHER SPECIFIED CONDITIONS: Chronic | Status: ACTIVE | Noted: 2018-04-23

## 2018-09-07 PROBLEM — J32.9 CHRONIC SINUSITIS, UNSPECIFIED: Chronic | Status: ACTIVE | Noted: 2017-12-21

## 2018-09-07 PROBLEM — N80.9 ENDOMETRIOSIS, UNSPECIFIED: Chronic | Status: ACTIVE | Noted: 2018-04-23

## 2018-09-07 PROBLEM — J18.9 PNEUMONIA, UNSPECIFIED ORGANISM: Chronic | Status: ACTIVE | Noted: 2017-12-21

## 2018-09-07 PROBLEM — N83.209 UNSPECIFIED OVARIAN CYST, UNSPECIFIED SIDE: Chronic | Status: ACTIVE | Noted: 2017-12-21

## 2018-09-07 PROBLEM — Z91.09 OTHER ALLERGY STATUS, OTHER THAN TO DRUGS AND BIOLOGICAL SUBSTANCES: Chronic | Status: ACTIVE | Noted: 2017-12-21

## 2018-09-07 PROBLEM — H01.006 UNSPECIFIED BLEPHARITIS LEFT EYE, UNSPECIFIED EYELID: Chronic | Status: ACTIVE | Noted: 2018-04-23

## 2018-09-07 PROBLEM — K64.9 UNSPECIFIED HEMORRHOIDS: Chronic | Status: ACTIVE | Noted: 2018-04-23

## 2018-09-07 PROBLEM — E03.9 HYPOTHYROIDISM, UNSPECIFIED: Chronic | Status: ACTIVE | Noted: 2017-12-21

## 2018-10-08 ENCOUNTER — RX RENEWAL (OUTPATIENT)
Age: 48
End: 2018-10-08

## 2018-10-10 ENCOUNTER — APPOINTMENT (OUTPATIENT)
Dept: PULMONOLOGY | Facility: CLINIC | Age: 48
End: 2018-10-10

## 2018-10-14 ENCOUNTER — TRANSCRIPTION ENCOUNTER (OUTPATIENT)
Age: 48
End: 2018-10-14

## 2018-10-21 ENCOUNTER — TRANSCRIPTION ENCOUNTER (OUTPATIENT)
Age: 48
End: 2018-10-21

## 2018-10-22 ENCOUNTER — RX RENEWAL (OUTPATIENT)
Age: 48
End: 2018-10-22

## 2018-10-28 ENCOUNTER — EMERGENCY (EMERGENCY)
Facility: HOSPITAL | Age: 48
LOS: 1 days | Discharge: HOME | End: 2018-10-28
Attending: EMERGENCY MEDICINE | Admitting: EMERGENCY MEDICINE
Payer: MEDICAID

## 2018-10-28 VITALS
WEIGHT: 154.98 LBS | RESPIRATION RATE: 16 BRPM | SYSTOLIC BLOOD PRESSURE: 130 MMHG | HEART RATE: 92 BPM | TEMPERATURE: 98 F | DIASTOLIC BLOOD PRESSURE: 79 MMHG | HEIGHT: 67 IN | OXYGEN SATURATION: 98 %

## 2018-10-28 VITALS
TEMPERATURE: 98 F | OXYGEN SATURATION: 96 % | HEART RATE: 79 BPM | SYSTOLIC BLOOD PRESSURE: 118 MMHG | DIASTOLIC BLOOD PRESSURE: 76 MMHG | RESPIRATION RATE: 15 BRPM

## 2018-10-28 DIAGNOSIS — N63.20 UNSPECIFIED LUMP IN THE LEFT BREAST, UNSPECIFIED QUADRANT: Chronic | ICD-10-CM

## 2018-10-28 DIAGNOSIS — R59.0 LOCALIZED ENLARGED LYMPH NODES: Chronic | ICD-10-CM

## 2018-10-28 DIAGNOSIS — R52 PAIN, UNSPECIFIED: Chronic | ICD-10-CM

## 2018-10-28 LAB
ALBUMIN SERPL ELPH-MCNC: 4 G/DL — SIGNIFICANT CHANGE UP (ref 3.3–5)
ALP SERPL-CCNC: 86 U/L — SIGNIFICANT CHANGE UP (ref 30–120)
ALT FLD-CCNC: 38 U/L DA — SIGNIFICANT CHANGE UP (ref 10–60)
ANION GAP SERPL CALC-SCNC: 13 MMOL/L — SIGNIFICANT CHANGE UP (ref 5–17)
AST SERPL-CCNC: 29 U/L — SIGNIFICANT CHANGE UP (ref 10–40)
BASOPHILS # BLD AUTO: 0.04 K/UL — SIGNIFICANT CHANGE UP (ref 0–0.2)
BASOPHILS NFR BLD AUTO: 0.6 % — SIGNIFICANT CHANGE UP (ref 0–2)
BILIRUB SERPL-MCNC: 0.2 MG/DL — SIGNIFICANT CHANGE UP (ref 0.2–1.2)
BUN SERPL-MCNC: 14 MG/DL — SIGNIFICANT CHANGE UP (ref 7–23)
CALCIUM SERPL-MCNC: 9 MG/DL — SIGNIFICANT CHANGE UP (ref 8.4–10.5)
CHLORIDE SERPL-SCNC: 105 MMOL/L — SIGNIFICANT CHANGE UP (ref 96–108)
CO2 SERPL-SCNC: 24 MMOL/L — SIGNIFICANT CHANGE UP (ref 22–31)
CREAT SERPL-MCNC: 0.85 MG/DL — SIGNIFICANT CHANGE UP (ref 0.5–1.3)
EOSINOPHIL # BLD AUTO: 0.06 K/UL — SIGNIFICANT CHANGE UP (ref 0–0.5)
EOSINOPHIL NFR BLD AUTO: 0.9 % — SIGNIFICANT CHANGE UP (ref 0–6)
GLUCOSE SERPL-MCNC: 99 MG/DL — SIGNIFICANT CHANGE UP (ref 70–99)
HCT VFR BLD CALC: 41.9 % — SIGNIFICANT CHANGE UP (ref 34.5–45)
HETEROPH AB TITR SER AGGL: NEGATIVE — SIGNIFICANT CHANGE UP
HGB BLD-MCNC: 14.1 G/DL — SIGNIFICANT CHANGE UP (ref 11.5–15.5)
IMM GRANULOCYTES NFR BLD AUTO: 0.3 % — SIGNIFICANT CHANGE UP (ref 0–1.5)
LACTATE SERPL-SCNC: 1.6 MMOL/L — SIGNIFICANT CHANGE UP (ref 0.7–2)
LYMPHOCYTES # BLD AUTO: 1.5 K/UL — SIGNIFICANT CHANGE UP (ref 1–3.3)
LYMPHOCYTES # BLD AUTO: 22.3 % — SIGNIFICANT CHANGE UP (ref 13–44)
MCHC RBC-ENTMCNC: 28.1 PG — SIGNIFICANT CHANGE UP (ref 27–34)
MCHC RBC-ENTMCNC: 33.7 GM/DL — SIGNIFICANT CHANGE UP (ref 32–36)
MCV RBC AUTO: 83.6 FL — SIGNIFICANT CHANGE UP (ref 80–100)
MONOCYTES # BLD AUTO: 0.35 K/UL — SIGNIFICANT CHANGE UP (ref 0–0.9)
MONOCYTES NFR BLD AUTO: 5.2 % — SIGNIFICANT CHANGE UP (ref 2–14)
NEUTROPHILS # BLD AUTO: 4.75 K/UL — SIGNIFICANT CHANGE UP (ref 1.8–7.4)
NEUTROPHILS NFR BLD AUTO: 70.7 % — SIGNIFICANT CHANGE UP (ref 43–77)
PLATELET # BLD AUTO: 214 K/UL — SIGNIFICANT CHANGE UP (ref 150–400)
POTASSIUM SERPL-MCNC: 3.8 MMOL/L — SIGNIFICANT CHANGE UP (ref 3.5–5.3)
POTASSIUM SERPL-SCNC: 3.8 MMOL/L — SIGNIFICANT CHANGE UP (ref 3.5–5.3)
PROT SERPL-MCNC: 7.3 G/DL — SIGNIFICANT CHANGE UP (ref 6–8.3)
RBC # BLD: 5.01 M/UL — SIGNIFICANT CHANGE UP (ref 3.8–5.2)
RBC # FLD: 13.3 % — SIGNIFICANT CHANGE UP (ref 10.3–14.5)
SODIUM SERPL-SCNC: 142 MMOL/L — SIGNIFICANT CHANGE UP (ref 135–145)
WBC # BLD: 6.72 K/UL — SIGNIFICANT CHANGE UP (ref 3.8–10.5)
WBC # FLD AUTO: 6.72 K/UL — SIGNIFICANT CHANGE UP (ref 3.8–10.5)

## 2018-10-28 PROCEDURE — 96375 TX/PRO/DX INJ NEW DRUG ADDON: CPT

## 2018-10-28 PROCEDURE — 96374 THER/PROPH/DIAG INJ IV PUSH: CPT

## 2018-10-28 PROCEDURE — 94664 DEMO&/EVAL PT USE INHALER: CPT

## 2018-10-28 PROCEDURE — 83605 ASSAY OF LACTIC ACID: CPT

## 2018-10-28 PROCEDURE — 71046 X-RAY EXAM CHEST 2 VIEWS: CPT

## 2018-10-28 PROCEDURE — 70491 CT SOFT TISSUE NECK W/DYE: CPT

## 2018-10-28 PROCEDURE — 99284 EMERGENCY DEPT VISIT MOD MDM: CPT | Mod: 25

## 2018-10-28 PROCEDURE — 86308 HETEROPHILE ANTIBODY SCREEN: CPT

## 2018-10-28 PROCEDURE — 80053 COMPREHEN METABOLIC PANEL: CPT

## 2018-10-28 PROCEDURE — 85027 COMPLETE CBC AUTOMATED: CPT

## 2018-10-28 PROCEDURE — 36415 COLL VENOUS BLD VENIPUNCTURE: CPT

## 2018-10-28 PROCEDURE — 99284 EMERGENCY DEPT VISIT MOD MDM: CPT

## 2018-10-28 PROCEDURE — 94640 AIRWAY INHALATION TREATMENT: CPT

## 2018-10-28 PROCEDURE — 71046 X-RAY EXAM CHEST 2 VIEWS: CPT | Mod: 26

## 2018-10-28 PROCEDURE — 70491 CT SOFT TISSUE NECK W/DYE: CPT | Mod: 26

## 2018-10-28 RX ORDER — FEXOFENADINE HCL 30 MG
180 TABLET ORAL
Qty: 0 | Refills: 0 | COMMUNITY

## 2018-10-28 RX ORDER — DEXAMETHASONE 0.5 MG/5ML
6 ELIXIR ORAL ONCE
Qty: 0 | Refills: 0 | Status: COMPLETED | OUTPATIENT
Start: 2018-10-28 | End: 2018-10-28

## 2018-10-28 RX ORDER — IBUPROFEN 200 MG
1 TABLET ORAL
Qty: 0 | Refills: 0 | COMMUNITY

## 2018-10-28 RX ORDER — SODIUM CHLORIDE 9 MG/ML
1000 INJECTION INTRAMUSCULAR; INTRAVENOUS; SUBCUTANEOUS ONCE
Qty: 0 | Refills: 0 | Status: COMPLETED | OUTPATIENT
Start: 2018-10-28 | End: 2018-10-28

## 2018-10-28 RX ORDER — ACETAMINOPHEN 500 MG
2 TABLET ORAL
Qty: 0 | Refills: 0 | COMMUNITY

## 2018-10-28 RX ORDER — CEFUROXIME AXETIL 250 MG
1 TABLET ORAL
Qty: 14 | Refills: 0 | OUTPATIENT
Start: 2018-10-28 | End: 2018-11-03

## 2018-10-28 RX ORDER — ALBUTEROL 90 UG/1
0 AEROSOL, METERED ORAL
Qty: 0 | Refills: 0 | COMMUNITY

## 2018-10-28 RX ORDER — IPRATROPIUM/ALBUTEROL SULFATE 18-103MCG
3 AEROSOL WITH ADAPTER (GRAM) INHALATION ONCE
Qty: 0 | Refills: 0 | Status: COMPLETED | OUTPATIENT
Start: 2018-10-28 | End: 2018-10-28

## 2018-10-28 RX ORDER — KETOROLAC TROMETHAMINE 30 MG/ML
30 SYRINGE (ML) INJECTION ONCE
Qty: 0 | Refills: 0 | Status: DISCONTINUED | OUTPATIENT
Start: 2018-10-28 | End: 2018-10-28

## 2018-10-28 RX ORDER — SERTRALINE 25 MG/1
150 TABLET, FILM COATED ORAL
Qty: 0 | Refills: 0 | COMMUNITY

## 2018-10-28 RX ORDER — LEVOTHYROXINE SODIUM 125 MCG
1 TABLET ORAL
Qty: 0 | Refills: 0 | COMMUNITY

## 2018-10-28 RX ORDER — OMEPRAZOLE 10 MG/1
1 CAPSULE, DELAYED RELEASE ORAL
Qty: 0 | Refills: 0 | COMMUNITY

## 2018-10-28 RX ORDER — SODIUM CHLORIDE 9 MG/ML
3 INJECTION INTRAMUSCULAR; INTRAVENOUS; SUBCUTANEOUS ONCE
Qty: 0 | Refills: 0 | Status: COMPLETED | OUTPATIENT
Start: 2018-10-28 | End: 2018-10-28

## 2018-10-28 RX ADMIN — SODIUM CHLORIDE 1000 MILLILITER(S): 9 INJECTION INTRAMUSCULAR; INTRAVENOUS; SUBCUTANEOUS at 13:08

## 2018-10-28 RX ADMIN — Medication 6 MILLIGRAM(S): at 15:46

## 2018-10-28 RX ADMIN — Medication 30 MILLIGRAM(S): at 13:07

## 2018-10-28 RX ADMIN — Medication 30 MILLIGRAM(S): at 13:45

## 2018-10-28 RX ADMIN — SODIUM CHLORIDE 3 MILLILITER(S): 9 INJECTION INTRAMUSCULAR; INTRAVENOUS; SUBCUTANEOUS at 13:08

## 2018-10-28 RX ADMIN — SODIUM CHLORIDE 1000 MILLILITER(S): 9 INJECTION INTRAMUSCULAR; INTRAVENOUS; SUBCUTANEOUS at 14:15

## 2018-10-28 RX ADMIN — Medication 3 MILLILITER(S): at 13:20

## 2018-10-28 NOTE — ED PROVIDER NOTE - PROGRESS NOTE DETAILS
Pt examined by ED attending, Dr. Barr who agreed with disposition and plan. Reevaluated patient at bedside.  Patient feeling much improved.  Discussed the results of all diagnostic testing in ED and copies of all reports given.   An opportunity to ask questions was given.  Discussed the importance of prompt, close medical follow-up.  Patient will return with any changes, concerns or persistent / worsening symptoms.  Understanding of all instructions verbalized. Reevaluated patient at bedside.  Patient feeling much improved.  Discussed the results of all diagnostic testing in ED and copies of all reports given. Pt tolerating po in ED, no respiratory distress, nontoxic appearing, VSS, lungs cta, afebrile, wbc normal, cxr and ct scan wnl, given one dose of decadron IV in ED, rx for abx, f/u close ent f/u.  An opportunity to ask questions was given.  Discussed the importance of prompt, close medical follow-up.  Patient will return with any changes, concerns or persistent / worsening symptoms.  Understanding of all instructions verbalized.

## 2018-10-28 NOTE — ED PROVIDER NOTE - MEDICAL DECISION MAKING DETAILS
48 y/o F with hx of asthma c/o sore throat x 3 weeks, seen in UC 2 weeks ago and given z-pack and prednisone, seen again a week later and given acyclovir for hsv in throat, pt still c/o throat pain and difficulty swallowing at times, mild dry cough x few days, low grade fever, chest tightness, lungs cta b here, afebrile, will check labs, cxr, ct soft tissue neck to r/o abscess, toradol/duoneb/decadron, re-assess

## 2018-10-28 NOTE — ED ADULT NURSE REASSESSMENT NOTE - NS ED NURSE REASSESS COMMENT FT1
pt feels better pt re evaluated by md and to be d'c/d  iv d'c/d  no s/s infiltration upon removal  pt discharged stable and ambulatory in nad at present d/c instruction reinforced and pt verbalized understanding vital signs as charted  pt explained prescription med is distant cousin of pcn so if develops throat pains or hives stop med immediately and return to ed and py verbalized understanding advised to follow up ent
pt states feels better after treatment but throat still hurts pt pending results

## 2018-10-28 NOTE — ED PROVIDER NOTE - PHYSICAL EXAMINATION
No drooling/stridor/hoarseness noted on exam, swallowing secretions without distress, speaking clearly in full sentences No drooling/stridor/hoarseness noted on exam, swallowing secretions without distress, speaking clearly in full sentences, throat without erythema/swelling/exudates/vesicles noted.

## 2018-10-28 NOTE — ED ADULT NURSE NOTE - OBJECTIVE STATEMENT
Amb to ED. Pt c/o swollen glands, throat pain, back discomfort, dry cough. Painful with deep inspiration. Color fair. Skin warm & dry to touch. Lungs-clear. Throat- clear. Was treated 3 wks ago with Z-pack for similar complaints but feels no improvement

## 2018-10-28 NOTE — ED PROVIDER NOTE - OBJECTIVE STATEMENT
46 y/o F with hx of asthma, anemia, MVP presents with c/o sore throat x 3 weeks. Pt states that she was initially having sore throat, nasal congestion and fever, seen in urgent care a week later and had neg flu test, given Z-pack and prednisone. 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 Pt states that she still have throat pain, fever has improved and low grade now, occassionally feels like throat is swollen/tight. Pt admits to dry cough for a few days and chest tightness. Denies hx of admissions or intubations for asthma, v/d, recent travel, sick contacts, body aches, rash, dysuria, abdominal pain or other symptoms. Pt last took 1g of tylenol and 400mg motrin at 8am this morning. 46 y/o F with hx of asthma, anemia, MVP presents with c/o sore throat x 3 weeks. Pt states that she was initially having sore throat, nasal congestion and fever, seen in urgent care a week later and had neg flu test, given Z-pack and prednisone. 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 Pt states that she still have throat pain, fever has improved and low grade now, occasionally feels like throat is swollen/tight. Pt admits to dry cough for a few days and mild chest tightness associated with her asthma. Denies drooling, hoarseness, hx of admissions or intubations for asthma, v/d, recent travel, sick contacts, body aches, rash, dysuria, abdominal pain or other symptoms. Pt last took 1g of Tylenol and 400mg Motrin at 8am this morning.

## 2018-10-30 ENCOUNTER — APPOINTMENT (OUTPATIENT)
Dept: OTOLARYNGOLOGY | Facility: CLINIC | Age: 48
End: 2018-10-30
Payer: MEDICAID

## 2018-10-30 VITALS
DIASTOLIC BLOOD PRESSURE: 84 MMHG | BODY MASS INDEX: 25.58 KG/M2 | HEIGHT: 67 IN | SYSTOLIC BLOOD PRESSURE: 115 MMHG | WEIGHT: 163 LBS | HEART RATE: 76 BPM

## 2018-10-30 DIAGNOSIS — J34.2 DEVIATED NASAL SEPTUM: ICD-10-CM

## 2018-10-30 DIAGNOSIS — K21.9 GASTRO-ESOPHAGEAL REFLUX DISEASE W/OUT ESOPHAGITIS: ICD-10-CM

## 2018-10-30 DIAGNOSIS — B37.0 CANDIDAL STOMATITIS: ICD-10-CM

## 2018-10-30 PROCEDURE — 31575 DIAGNOSTIC LARYNGOSCOPY: CPT

## 2018-10-30 PROCEDURE — 99214 OFFICE O/P EST MOD 30 MIN: CPT | Mod: 25

## 2018-11-01 PROBLEM — B37.0 THRUSH, ORAL: Status: ACTIVE | Noted: 2018-11-01

## 2018-11-07 ENCOUNTER — RX RENEWAL (OUTPATIENT)
Age: 48
End: 2018-11-07

## 2018-11-08 ENCOUNTER — RX RENEWAL (OUTPATIENT)
Age: 48
End: 2018-11-08

## 2018-11-11 ENCOUNTER — TRANSCRIPTION ENCOUNTER (OUTPATIENT)
Age: 48
End: 2018-11-11

## 2018-11-21 ENCOUNTER — APPOINTMENT (OUTPATIENT)
Dept: OTOLARYNGOLOGY | Facility: CLINIC | Age: 48
End: 2018-11-21
Payer: COMMERCIAL

## 2018-11-21 VITALS
SYSTOLIC BLOOD PRESSURE: 122 MMHG | DIASTOLIC BLOOD PRESSURE: 82 MMHG | WEIGHT: 155 LBS | BODY MASS INDEX: 24.33 KG/M2 | HEIGHT: 67 IN | HEART RATE: 75 BPM

## 2018-11-21 DIAGNOSIS — J03.90 ACUTE TONSILLITIS, UNSPECIFIED: ICD-10-CM

## 2018-11-21 PROCEDURE — 31575 DIAGNOSTIC LARYNGOSCOPY: CPT

## 2018-11-21 PROCEDURE — 99214 OFFICE O/P EST MOD 30 MIN: CPT | Mod: 25

## 2018-11-29 ENCOUNTER — APPOINTMENT (OUTPATIENT)
Dept: OTOLARYNGOLOGY | Facility: CLINIC | Age: 48
End: 2018-11-29
Payer: COMMERCIAL

## 2018-11-29 VITALS
HEIGHT: 67 IN | WEIGHT: 155 LBS | BODY MASS INDEX: 24.33 KG/M2 | HEART RATE: 80 BPM | DIASTOLIC BLOOD PRESSURE: 73 MMHG | SYSTOLIC BLOOD PRESSURE: 132 MMHG

## 2018-11-29 DIAGNOSIS — H93.8X2 OTHER SPECIFIED DISORDERS OF LEFT EAR: ICD-10-CM

## 2018-11-29 DIAGNOSIS — R07.0 PAIN IN THROAT: ICD-10-CM

## 2018-11-29 PROCEDURE — 31575 DIAGNOSTIC LARYNGOSCOPY: CPT

## 2018-11-29 PROCEDURE — 92557 COMPREHENSIVE HEARING TEST: CPT

## 2018-11-29 PROCEDURE — 99214 OFFICE O/P EST MOD 30 MIN: CPT | Mod: 25

## 2018-11-29 PROCEDURE — 92567 TYMPANOMETRY: CPT

## 2018-12-22 ENCOUNTER — TRANSCRIPTION ENCOUNTER (OUTPATIENT)
Age: 48
End: 2018-12-22

## 2019-03-22 ENCOUNTER — TRANSCRIPTION ENCOUNTER (OUTPATIENT)
Age: 49
End: 2019-03-22

## 2019-04-22 ENCOUNTER — RX CHANGE (OUTPATIENT)
Age: 49
End: 2019-04-22

## 2019-05-17 ENCOUNTER — APPOINTMENT (OUTPATIENT)
Dept: OBGYN | Facility: CLINIC | Age: 49
End: 2019-05-17

## 2019-05-21 ENCOUNTER — MEDICATION RENEWAL (OUTPATIENT)
Age: 49
End: 2019-05-21

## 2019-05-24 ENCOUNTER — RX RENEWAL (OUTPATIENT)
Age: 49
End: 2019-05-24

## 2019-07-05 NOTE — H&P PST ADULT - RESPIRATORY
----- Message from Noy Aldridge MA sent at 7/3/2019 11:45 AM CDT -----  Check med referral   
IUD is covered as a buy & bill. Please call patient to get her scheduled for insertion.  
LVM for patient to return my call regarding scheduling IUD insertion.  
detailed exam

## 2019-07-07 PROBLEM — Z86.19 HISTORY OF CLOSTRIDIUM DIFFICILE COLITIS: Status: RESOLVED | Noted: 2018-03-01 | Resolved: 2019-07-07

## 2019-07-08 ENCOUNTER — RX RENEWAL (OUTPATIENT)
Age: 49
End: 2019-07-08

## 2019-08-06 ENCOUNTER — MEDICATION RENEWAL (OUTPATIENT)
Age: 49
End: 2019-08-06

## 2019-08-06 ENCOUNTER — MESSAGE (OUTPATIENT)
Age: 49
End: 2019-08-06

## 2019-08-21 ENCOUNTER — APPOINTMENT (OUTPATIENT)
Dept: INTERNAL MEDICINE | Facility: CLINIC | Age: 49
End: 2019-08-21
Payer: COMMERCIAL

## 2019-08-21 VITALS
WEIGHT: 162 LBS | OXYGEN SATURATION: 99 % | RESPIRATION RATE: 17 BRPM | TEMPERATURE: 98.6 F | BODY MASS INDEX: 25.43 KG/M2 | DIASTOLIC BLOOD PRESSURE: 80 MMHG | SYSTOLIC BLOOD PRESSURE: 130 MMHG | HEART RATE: 80 BPM | HEIGHT: 67 IN

## 2019-08-21 DIAGNOSIS — Z00.00 ENCOUNTER FOR GENERAL ADULT MEDICAL EXAMINATION W/OUT ABNORMAL FINDINGS: ICD-10-CM

## 2019-08-21 DIAGNOSIS — E03.9 HYPOTHYROIDISM, UNSPECIFIED: ICD-10-CM

## 2019-08-21 PROCEDURE — 99396 PREV VISIT EST AGE 40-64: CPT

## 2019-08-21 RX ORDER — FLUTICASONE PROPIONATE 50 UG/1
50 SPRAY, METERED NASAL
Qty: 1 | Refills: 0 | Status: DISCONTINUED | COMMUNITY
Start: 2018-06-27 | End: 2019-08-21

## 2019-08-21 RX ORDER — AZITHROMYCIN 250 MG/1
250 TABLET, FILM COATED ORAL
Qty: 1 | Refills: 3 | Status: DISCONTINUED | COMMUNITY
Start: 2018-10-30 | End: 2019-08-21

## 2019-08-21 RX ORDER — NYSTATIN 100000 [USP'U]/ML
100000 SUSPENSION ORAL
Qty: 70 | Refills: 0 | Status: DISCONTINUED | COMMUNITY
Start: 2018-11-01 | End: 2019-08-21

## 2019-08-21 RX ORDER — NAPROXEN 500 MG/1
500 TABLET ORAL
Qty: 100 | Refills: 2 | Status: DISCONTINUED | COMMUNITY
Start: 2018-05-15 | End: 2019-08-21

## 2019-08-21 RX ORDER — METHYLPREDNISOLONE 4 MG/1
4 TABLET ORAL
Qty: 1 | Refills: 0 | Status: DISCONTINUED | COMMUNITY
Start: 2018-10-30 | End: 2019-08-21

## 2019-08-22 LAB
25(OH)D3 SERPL-MCNC: 38 NG/ML
ALBUMIN SERPL ELPH-MCNC: 4.9 G/DL
ALP BLD-CCNC: 67 U/L
ALT SERPL-CCNC: 18 U/L
ANION GAP SERPL CALC-SCNC: 13 MMOL/L
APPEARANCE: CLEAR
AST SERPL-CCNC: 17 U/L
BASOPHILS # BLD AUTO: 0.03 K/UL
BASOPHILS NFR BLD AUTO: 0.6 %
BILIRUB SERPL-MCNC: 0.3 MG/DL
BILIRUBIN URINE: NEGATIVE
BLOOD URINE: NEGATIVE
BUN SERPL-MCNC: 11 MG/DL
CALCIUM SERPL-MCNC: 9.4 MG/DL
CHLORIDE SERPL-SCNC: 103 MMOL/L
CHOLEST SERPL-MCNC: 224 MG/DL
CHOLEST/HDLC SERPL: 3.6 RATIO
CO2 SERPL-SCNC: 24 MMOL/L
COLOR: NORMAL
CREAT SERPL-MCNC: 0.72 MG/DL
CRP SERPL-MCNC: <0.1 MG/DL
EOSINOPHIL # BLD AUTO: 0.11 K/UL
EOSINOPHIL NFR BLD AUTO: 2.1 %
ESTIMATED AVERAGE GLUCOSE: 103 MG/DL
GLUCOSE QUALITATIVE U: NEGATIVE
GLUCOSE SERPL-MCNC: 77 MG/DL
HBA1C MFR BLD HPLC: 5.2 %
HCT VFR BLD CALC: 47.3 %
HDLC SERPL-MCNC: 63 MG/DL
HGB BLD-MCNC: 14.6 G/DL
IMM GRANULOCYTES NFR BLD AUTO: 0.2 %
KETONES URINE: NEGATIVE
LDLC SERPL CALC-MCNC: 141 MG/DL
LEUKOCYTE ESTERASE URINE: NEGATIVE
LYMPHOCYTES # BLD AUTO: 1.44 K/UL
LYMPHOCYTES NFR BLD AUTO: 27.4 %
MAN DIFF?: NORMAL
MCHC RBC-ENTMCNC: 26.6 PG
MCHC RBC-ENTMCNC: 30.9 GM/DL
MCV RBC AUTO: 86.3 FL
MONOCYTES # BLD AUTO: 0.43 K/UL
MONOCYTES NFR BLD AUTO: 8.2 %
NEUTROPHILS # BLD AUTO: 3.24 K/UL
NEUTROPHILS NFR BLD AUTO: 61.5 %
NITRITE URINE: NEGATIVE
PH URINE: 6.5
PLATELET # BLD AUTO: 213 K/UL
POTASSIUM SERPL-SCNC: 4.4 MMOL/L
PROT SERPL-MCNC: 7.5 G/DL
PROTEIN URINE: NEGATIVE
RBC # BLD: 5.48 M/UL
RBC # FLD: 14.3 %
SODIUM SERPL-SCNC: 140 MMOL/L
SPECIFIC GRAVITY URINE: 1.01
T4 FREE SERPL-MCNC: 1.2 NG/DL
TRIGL SERPL-MCNC: 99 MG/DL
TSH SERPL-ACNC: 2.06 UIU/ML
UROBILINOGEN URINE: NORMAL
VIT B12 SERPL-MCNC: 510 PG/ML
WBC # FLD AUTO: 5.26 K/UL

## 2019-08-26 ENCOUNTER — MEDICATION RENEWAL (OUTPATIENT)
Age: 49
End: 2019-08-26

## 2019-08-26 RX ORDER — ALBUTEROL SULFATE 90 UG/1
108 (90 BASE) AEROSOL, METERED RESPIRATORY (INHALATION)
Qty: 1 | Refills: 1 | Status: ACTIVE | COMMUNITY
Start: 2017-04-20 | End: 1900-01-01

## 2019-09-04 ENCOUNTER — APPOINTMENT (OUTPATIENT)
Dept: INTERNAL MEDICINE | Facility: CLINIC | Age: 49
End: 2019-09-04

## 2019-09-24 ENCOUNTER — APPOINTMENT (OUTPATIENT)
Dept: OBGYN | Facility: CLINIC | Age: 49
End: 2019-09-24

## 2019-09-25 ENCOUNTER — APPOINTMENT (OUTPATIENT)
Dept: OBGYN | Facility: CLINIC | Age: 49
End: 2019-09-25
Payer: COMMERCIAL

## 2019-09-25 VITALS
DIASTOLIC BLOOD PRESSURE: 80 MMHG | HEART RATE: 87 BPM | HEIGHT: 67 IN | SYSTOLIC BLOOD PRESSURE: 114 MMHG | BODY MASS INDEX: 23.86 KG/M2 | WEIGHT: 152 LBS

## 2019-09-25 DIAGNOSIS — Z12.39 ENCOUNTER FOR OTHER SCREENING FOR MALIGNANT NEOPLASM OF BREAST: ICD-10-CM

## 2019-09-25 DIAGNOSIS — N89.8 OTHER SPECIFIED NONINFLAMMATORY DISORDERS OF VAGINA: ICD-10-CM

## 2019-09-25 DIAGNOSIS — Z87.19 PERSONAL HISTORY OF OTHER DISEASES OF THE DIGESTIVE SYSTEM: ICD-10-CM

## 2019-09-25 DIAGNOSIS — R79.89 OTHER SPECIFIED ABNORMAL FINDINGS OF BLOOD CHEMISTRY: ICD-10-CM

## 2019-09-25 DIAGNOSIS — Z00.00 ENCOUNTER FOR GENERAL ADULT MEDICAL EXAMINATION W/OUT ABNORMAL FINDINGS: ICD-10-CM

## 2019-09-25 DIAGNOSIS — R82.90 UNSPECIFIED ABNORMAL FINDINGS IN URINE: ICD-10-CM

## 2019-09-25 DIAGNOSIS — H90.12 CONDUCTIVE HEARING LOSS, UNILATERAL, LEFT EAR, WITH UNRESTRICTED HEARING ON THE CONTRALATERAL SIDE: ICD-10-CM

## 2019-09-25 PROCEDURE — 99213 OFFICE O/P EST LOW 20 MIN: CPT

## 2019-09-30 ENCOUNTER — TRANSCRIPTION ENCOUNTER (OUTPATIENT)
Age: 49
End: 2019-09-30

## 2019-10-15 ENCOUNTER — OTHER (OUTPATIENT)
Age: 49
End: 2019-10-15

## 2019-10-15 LAB
CANDIDA VAG CYTO: NOT DETECTED
G VAGINALIS+PREV SP MTYP VAG QL MICRO: NOT DETECTED
T VAGINALIS VAG QL WET PREP: NOT DETECTED

## 2019-10-24 NOTE — ED ADULT TRIAGE NOTE - PAIN RATING/NUMBER SCALE (0-10): REST
Eric M Bohler Patient Age: 30 year old  MESSAGE:   Patient is asking if he will be getting stiches at his apt on 11/6 and if so, can he swim in salt water afterwards.   Call transferred to clinical staff.       WEIGHT AND HEIGHT:   Wt Readings from Last 1 Encounters:   No data found for Wt     Ht Readings from Last 1 Encounters:   No data found for Ht     BMI Readings from Last 1 Encounters:   No data found for BMI       ALLERGIES:  Latex  Current Outpatient Medications   Medication   • escitalopram (LEXAPRO) 10 MG tablet   • famotidine (PEPCID) 20 MG tablet   • fexofenadine (ALLEGRA) 180 MG tablet   • fluticasone (FLONASE) 50 MCG/ACT nasal spray     No current facility-administered medications for this visit.      PHARMACY to use: [facility]          Pharmacy preference(s) on file:   [id number] - [place], [place] - [address] [facility] [address]  [address]  [facility] [address]  Phone: [phone] Fax: [phone]    [id number] - [place], [place] - [address] [facility] [address] [place] [address] [place] [address]  [address] [facility] [address]  Phone: [phone] Fax: [phone]      CALL BACK INFO: Ok to leave response (including medical information) on answering machine  ROUTING: Patient's physician/staff        PCP: Pcp Not In System         INS: Payor: VAHID/JANAY / Plan: [id number] / Product Type: PPO MISC   PATIENT ADDRESS:  [facility] [place] [address]   10

## 2019-12-11 ENCOUNTER — FORM ENCOUNTER (OUTPATIENT)
Age: 49
End: 2019-12-11

## 2019-12-12 ENCOUNTER — APPOINTMENT (OUTPATIENT)
Dept: MAMMOGRAPHY | Facility: CLINIC | Age: 49
End: 2019-12-12
Payer: COMMERCIAL

## 2019-12-12 ENCOUNTER — OUTPATIENT (OUTPATIENT)
Dept: OUTPATIENT SERVICES | Facility: HOSPITAL | Age: 49
LOS: 1 days | End: 2019-12-12
Payer: COMMERCIAL

## 2019-12-12 ENCOUNTER — APPOINTMENT (OUTPATIENT)
Dept: ULTRASOUND IMAGING | Facility: CLINIC | Age: 49
End: 2019-12-12
Payer: COMMERCIAL

## 2019-12-12 DIAGNOSIS — N63.20 UNSPECIFIED LUMP IN THE LEFT BREAST, UNSPECIFIED QUADRANT: Chronic | ICD-10-CM

## 2019-12-12 DIAGNOSIS — R59.0 LOCALIZED ENLARGED LYMPH NODES: Chronic | ICD-10-CM

## 2019-12-12 DIAGNOSIS — Z12.31 ENCOUNTER FOR SCREENING MAMMOGRAM FOR MALIGNANT NEOPLASM OF BREAST: ICD-10-CM

## 2019-12-12 DIAGNOSIS — R52 PAIN, UNSPECIFIED: Chronic | ICD-10-CM

## 2019-12-12 DIAGNOSIS — R10.2 PELVIC AND PERINEAL PAIN: ICD-10-CM

## 2019-12-12 PROCEDURE — 77063 BREAST TOMOSYNTHESIS BI: CPT

## 2019-12-12 PROCEDURE — 77067 SCR MAMMO BI INCL CAD: CPT

## 2019-12-12 PROCEDURE — 77067 SCR MAMMO BI INCL CAD: CPT | Mod: 26

## 2019-12-12 PROCEDURE — 77063 BREAST TOMOSYNTHESIS BI: CPT | Mod: 26

## 2019-12-12 PROCEDURE — 76830 TRANSVAGINAL US NON-OB: CPT | Mod: 26

## 2019-12-12 PROCEDURE — 76830 TRANSVAGINAL US NON-OB: CPT

## 2019-12-13 ENCOUNTER — MEDICATION RENEWAL (OUTPATIENT)
Age: 49
End: 2019-12-13

## 2019-12-17 ENCOUNTER — APPOINTMENT (OUTPATIENT)
Dept: INTERNAL MEDICINE | Facility: CLINIC | Age: 49
End: 2019-12-17

## 2019-12-18 PROBLEM — Z87.19 HISTORY OF COLITIS: Status: RESOLVED | Noted: 2019-08-21 | Resolved: 2019-12-18

## 2019-12-18 PROBLEM — Z00.00 ANNUAL PHYSICAL EXAM: Status: RESOLVED | Noted: 2019-08-21 | Resolved: 2019-12-18

## 2019-12-18 PROBLEM — R79.89 ABNORMAL CBC: Status: RESOLVED | Noted: 2019-08-22 | Resolved: 2019-12-18

## 2019-12-18 PROBLEM — H90.12 CONDUCTIVE HEARING LOSS OF LEFT EAR WITH UNRESTRICTED HEARING OF RIGHT EAR: Status: RESOLVED | Noted: 2018-11-29 | Resolved: 2019-12-18

## 2019-12-18 PROBLEM — Z12.39 BREAST SCREENING: Status: RESOLVED | Noted: 2018-05-29 | Resolved: 2019-12-18

## 2019-12-18 PROBLEM — R82.90 ABNORMAL URINE FINDINGS: Status: RESOLVED | Noted: 2018-06-29 | Resolved: 2019-12-18

## 2019-12-18 NOTE — PHYSICAL EXAM
[Awake] : awake [Alert] : alert [Acute Distress] : no acute distress [Soft] : soft [Tender] : non tender [Distended] : not distended [None] : no CVA tenderness [Oriented x3] : oriented to person, place, and time [Depressed Mood] : not depressed [Normal] : vagina [Scant] : scant [Discharge] : a  ~M vaginal discharge was present [Absent] : absent [White] : white [Adnexa Absent] : was absent on the right [Uterine Adnexae] : was normal on the left

## 2019-12-19 ENCOUNTER — APPOINTMENT (OUTPATIENT)
Dept: OBGYN | Facility: CLINIC | Age: 49
End: 2019-12-19

## 2019-12-25 PROBLEM — R10.2 PELVIC PAIN IN FEMALE: Status: ACTIVE | Noted: 2018-02-10

## 2020-01-19 ENCOUNTER — INPATIENT (INPATIENT)
Facility: HOSPITAL | Age: 50
LOS: 2 days | Discharge: HOME | End: 2020-01-22
Payer: COMMERCIAL

## 2020-01-19 ENCOUNTER — OUTPATIENT (OUTPATIENT)
Dept: OUTPATIENT SERVICES | Facility: HOSPITAL | Age: 50
LOS: 1 days | End: 2020-01-19

## 2020-01-19 DIAGNOSIS — R52 PAIN, UNSPECIFIED: Chronic | ICD-10-CM

## 2020-01-19 DIAGNOSIS — N63.20 UNSPECIFIED LUMP IN THE LEFT BREAST, UNSPECIFIED QUADRANT: Chronic | ICD-10-CM

## 2020-01-19 DIAGNOSIS — R59.0 LOCALIZED ENLARGED LYMPH NODES: Chronic | ICD-10-CM

## 2020-01-19 PROCEDURE — 71046 X-RAY EXAM CHEST 2 VIEWS: CPT | Mod: 26

## 2020-01-19 PROCEDURE — 71275 CT ANGIOGRAPHY CHEST: CPT | Mod: 26

## 2020-01-19 PROCEDURE — 99285 EMERGENCY DEPT VISIT HI MDM: CPT

## 2020-01-20 ENCOUNTER — OUTPATIENT (OUTPATIENT)
Dept: OUTPATIENT SERVICES | Facility: HOSPITAL | Age: 50
LOS: 1 days | End: 2020-01-20

## 2020-01-20 DIAGNOSIS — R52 PAIN, UNSPECIFIED: Chronic | ICD-10-CM

## 2020-01-20 DIAGNOSIS — R59.0 LOCALIZED ENLARGED LYMPH NODES: Chronic | ICD-10-CM

## 2020-01-20 DIAGNOSIS — N63.20 UNSPECIFIED LUMP IN THE LEFT BREAST, UNSPECIFIED QUADRANT: Chronic | ICD-10-CM

## 2020-01-21 ENCOUNTER — OUTPATIENT (OUTPATIENT)
Dept: OUTPATIENT SERVICES | Facility: HOSPITAL | Age: 50
LOS: 1 days | End: 2020-01-21

## 2020-01-21 DIAGNOSIS — R52 PAIN, UNSPECIFIED: Chronic | ICD-10-CM

## 2020-01-21 DIAGNOSIS — N63.20 UNSPECIFIED LUMP IN THE LEFT BREAST, UNSPECIFIED QUADRANT: Chronic | ICD-10-CM

## 2020-01-21 DIAGNOSIS — R59.0 LOCALIZED ENLARGED LYMPH NODES: Chronic | ICD-10-CM

## 2020-01-22 ENCOUNTER — OUTPATIENT (OUTPATIENT)
Dept: OUTPATIENT SERVICES | Facility: HOSPITAL | Age: 50
LOS: 1 days | End: 2020-01-22

## 2020-01-22 DIAGNOSIS — R59.0 LOCALIZED ENLARGED LYMPH NODES: Chronic | ICD-10-CM

## 2020-01-22 DIAGNOSIS — N63.20 UNSPECIFIED LUMP IN THE LEFT BREAST, UNSPECIFIED QUADRANT: Chronic | ICD-10-CM

## 2020-01-22 DIAGNOSIS — R52 PAIN, UNSPECIFIED: Chronic | ICD-10-CM

## 2020-01-22 PROCEDURE — 71046 X-RAY EXAM CHEST 2 VIEWS: CPT | Mod: 26

## 2020-02-25 NOTE — ASU PREOP CHECKLIST - WEIGHT IN KG
GYN Annual Exam     CC- Here for annual exam.     Lita Bejarano is a 36 y.o. female who presents for annual well woman exam. Periods are regular every 28-30 days, lasting 2 days. Dysmenorrhea:severe, occurring first 1-2 days of flow. Cyclic symptoms include none. No intermenstrual bleeding, spotting, or discharge.    Typical cycles 28-30 days x 2 days   Pain started pain in mid January   Started bleeding at the same time.   On POPs due to smoking   Left sided pain - intermittent  Worse with bleeding   Sharp pain, nothing helps     Pt c/o irregular period. LMP 2020- today. Bleeding has been heavy with severe cramping. Pain is sharp and stabbing and mainly on her L side. Pt has a h/o ovarian cyst. Bleeding has slowed since Friday.     OB History        2    Para   2    Term   2            AB        Living   2       SAB        TAB        Ectopic        Molar        Multiple        Live Births                    Current contraception: oral progesterone-only contraceptive  History of abnormal Pap smear: no  Family history of uterine, colon or ovarian cancer: no  History of abnormal mammogram: no  Family history of breast cancer: no  Last Pap : 2016 NL HPV neg    Past Medical History:   Diagnosis Date   • Back pain    • Seasonal allergies    • Seizures (CMS/HCC)        Past Surgical History:   Procedure Laterality Date   •  SECTION     • FOOT SURGERY     • TONSILLECTOMY           Current Outpatient Medications:   •  CALCIUM 600+D HIGH POTENCY 600-400 MG-UNIT per tablet, TK 1 T PO BID, Disp: , Rfl: 11  •  cetirizine (zyrTEC) 10 MG tablet, Take 10 mg by mouth Daily., Disp: , Rfl:   •  cyclobenzaprine (FLEXERIL) 10 MG tablet, , Disp: , Rfl: 0  •  cyclobenzaprine (FLEXERIL) 10 MG tablet, Take 10 mg by mouth., Disp: , Rfl:   •  hydrOXYzine (ATARAX) 25 MG tablet, , Disp: , Rfl: 5  •  ibuprofen (ADVIL,MOTRIN) 800 MG tablet, , Disp: , Rfl: 0  •  montelukast (SINGULAIR) 10 MG tablet, Take 10 mg by  "mouth Daily., Disp: , Rfl:   •  NICOTINE STEP 2 14 MG/24HR patch, PLACE ONE ON SKIN ONCE DAILY, Disp: , Rfl: 0  •  NORLYDA 0.35 MG tablet, , Disp: , Rfl:     Allergies   Allergen Reactions   • Penicillins Hives       Social History     Tobacco Use   • Smoking status: Former Smoker     Types: Cigarettes   • Smokeless tobacco: Never Used   Substance Use Topics   • Alcohol use: Yes   • Drug use: No       Family History   Problem Relation Age of Onset   • Hypertension Father    • Breast cancer Neg Hx    • Ovarian cancer Neg Hx    • Uterine cancer Neg Hx    • Colon cancer Neg Hx    • Deep vein thrombosis Neg Hx    • Pulmonary embolism Neg Hx        Review of Systems   Constitutional: Negative for chills and fever.   Gastrointestinal: Positive for abdominal pain, constipation, diarrhea, nausea and vomiting.   Genitourinary: Positive for menstrual problem, pelvic pain and vaginal bleeding. Negative for dysuria, hematuria and vaginal discharge.   All other systems reviewed and are negative.      /68   Pulse 91   Ht 144.8 cm (57\")   Wt 55.8 kg (123 lb)   LMP 01/13/2020 (Exact Date)   Breastfeeding No   BMI 26.62 kg/m²     Physical Exam   Constitutional: She is oriented to person, place, and time. She appears well-developed and well-nourished. No distress.   HENT:   Head: Normocephalic and atraumatic.   Eyes: Conjunctivae are normal. Right eye exhibits no discharge. Left eye exhibits no discharge.   Neck: Normal range of motion. Neck supple. No thyromegaly present.   Cardiovascular: Normal rate, regular rhythm and normal heart sounds.   No murmur heard.  Pulmonary/Chest: Effort normal and breath sounds normal. No respiratory distress.   Abdominal: Soft. Bowel sounds are normal. She exhibits no distension. There is no tenderness.   Genitourinary: Pelvic exam was performed with patient supine. There is no lesion or Bartholin's cyst on the right labia. There is no lesion or Bartholin's cyst on the left labia. Uterus " 72.6 is anteverted. Uterus is not deviated, enlarged, fixed or exhibiting a mass. Cervix does not exhibit motion tenderness or friability. Right adnexum displays no mass, no tenderness and no fullness. Left adnexum displays no mass, no tenderness and no fullness. There is bleeding (small amount of old blood ) in the vagina. No vaginal discharge found.   Musculoskeletal: Normal range of motion. She exhibits no edema.   Lymphadenopathy:     She has no cervical adenopathy.        Right: No inguinal adenopathy present.        Left: No inguinal adenopathy present.   Neurological: She is alert and oriented to person, place, and time.   Skin: Skin is warm and dry. No rash noted.   Psychiatric: She has a normal mood and affect. Her behavior is normal. Judgment and thought content normal.            Assessment     1) GYN annual well woman exam.   2) Pelvic pain and onset of breakthrough bleeding on POP  Check labs, cultures and ultrasound   Return to review results and see about need to change treatment.   Acute event so may not require alteration in care, but will see if need to address with evaluation   3) Tobacco use  Tobacco abuse addressed    1) Increases risk for heart disease, COPD and lung cancer   2) many ways to stop including hypnosis, cold turkey, weaning but if interested in patches, nicotine gum, or medications like zyban and Chantix will need to see PCP  3) Recommend weaning as ideal way to try and reduce risk to stop   Typically encourage to set goals every two weeks with a reduction by 50% in use of tobacco. Once down to a few a day, set quit day in the same manor.              Plan     1) Breast Health - Clinical breast exam yearly, Self breast awareness monthly  2) Pap - updated today   3) Smoking status- Working on quitting smoking   4) Activity recommends - Adult 150-300 min/week of multi-component physical activities that include balance training, aerobic and physical strengthening.    Avoidance of  distracted driving issues (texts, phone calls).   5) Follow up prn and one year.       Kingston Franco MD   2/25/2020  10:16 AM

## 2020-03-21 ENCOUNTER — TRANSCRIPTION ENCOUNTER (OUTPATIENT)
Age: 50
End: 2020-03-21

## 2020-03-28 ENCOUNTER — TRANSCRIPTION ENCOUNTER (OUTPATIENT)
Age: 50
End: 2020-03-28

## 2020-05-17 ENCOUNTER — EMERGENCY (EMERGENCY)
Facility: HOSPITAL | Age: 50
LOS: 1 days | End: 2020-05-17
Admitting: EMERGENCY MEDICINE
Payer: COMMERCIAL

## 2020-05-17 DIAGNOSIS — R59.0 LOCALIZED ENLARGED LYMPH NODES: Chronic | ICD-10-CM

## 2020-05-17 DIAGNOSIS — N63.20 UNSPECIFIED LUMP IN THE LEFT BREAST, UNSPECIFIED QUADRANT: Chronic | ICD-10-CM

## 2020-05-17 DIAGNOSIS — R52 PAIN, UNSPECIFIED: Chronic | ICD-10-CM

## 2020-05-17 PROCEDURE — 74177 CT ABD & PELVIS W/CONTRAST: CPT | Mod: 26

## 2020-05-17 PROCEDURE — 99285 EMERGENCY DEPT VISIT HI MDM: CPT

## 2020-05-17 PROCEDURE — 71045 X-RAY EXAM CHEST 1 VIEW: CPT | Mod: 26

## 2020-06-21 DIAGNOSIS — Z01.818 ENCOUNTER FOR OTHER PREPROCEDURAL EXAMINATION: ICD-10-CM

## 2020-06-23 ENCOUNTER — APPOINTMENT (OUTPATIENT)
Dept: DISASTER EMERGENCY | Facility: CLINIC | Age: 50
End: 2020-06-23

## 2020-06-24 LAB — SARS-COV-2 N GENE NPH QL NAA+PROBE: NOT DETECTED

## 2020-06-26 ENCOUNTER — OUTPATIENT (OUTPATIENT)
Dept: OUTPATIENT SERVICES | Facility: HOSPITAL | Age: 50
LOS: 1 days | End: 2020-06-26

## 2020-06-26 DIAGNOSIS — R52 PAIN, UNSPECIFIED: Chronic | ICD-10-CM

## 2020-06-26 DIAGNOSIS — N63.20 UNSPECIFIED LUMP IN THE LEFT BREAST, UNSPECIFIED QUADRANT: Chronic | ICD-10-CM

## 2020-06-26 DIAGNOSIS — R59.0 LOCALIZED ENLARGED LYMPH NODES: Chronic | ICD-10-CM

## 2020-07-14 ENCOUNTER — APPOINTMENT (OUTPATIENT)
Dept: DISASTER EMERGENCY | Facility: CLINIC | Age: 50
End: 2020-07-14

## 2020-07-15 ENCOUNTER — TRANSCRIPTION ENCOUNTER (OUTPATIENT)
Age: 50
End: 2020-07-15

## 2020-07-15 LAB — SARS-COV-2 N GENE NPH QL NAA+PROBE: NOT DETECTED

## 2020-07-16 ENCOUNTER — OUTPATIENT (OUTPATIENT)
Dept: OUTPATIENT SERVICES | Facility: HOSPITAL | Age: 50
LOS: 1 days | End: 2020-07-16
Payer: COMMERCIAL

## 2020-07-16 DIAGNOSIS — R13.10 DYSPHAGIA, UNSPECIFIED: ICD-10-CM

## 2020-07-16 DIAGNOSIS — K21.9 GASTRO-ESOPHAGEAL REFLUX DISEASE WITHOUT ESOPHAGITIS: ICD-10-CM

## 2020-07-16 DIAGNOSIS — N63.20 UNSPECIFIED LUMP IN THE LEFT BREAST, UNSPECIFIED QUADRANT: Chronic | ICD-10-CM

## 2020-07-16 DIAGNOSIS — R52 PAIN, UNSPECIFIED: Chronic | ICD-10-CM

## 2020-07-16 DIAGNOSIS — R59.0 LOCALIZED ENLARGED LYMPH NODES: Chronic | ICD-10-CM

## 2020-07-16 PROCEDURE — 91035 G-ESOPH REFLX TST W/ELECTROD: CPT

## 2020-07-16 PROCEDURE — C1889: CPT

## 2020-07-23 NOTE — H&P PST ADULT - LAST STRESS TEST
Patient came into the office - urine sample provided   In office urine dip done.    Results given to Dr. Sparkle Swenson to push Urostat or Azo -push fluids  Will send urine for a culture
denies

## 2020-08-03 ENCOUNTER — TRANSCRIPTION ENCOUNTER (OUTPATIENT)
Age: 50
End: 2020-08-03

## 2020-09-14 ENCOUNTER — APPOINTMENT (OUTPATIENT)
Dept: THORACIC SURGERY | Facility: CLINIC | Age: 50
End: 2020-09-14

## 2020-09-14 DIAGNOSIS — Z87.19 PERSONAL HISTORY OF OTHER DISEASES OF THE DIGESTIVE SYSTEM: ICD-10-CM

## 2020-09-14 NOTE — HISTORY OF PRESENT ILLNESS
[FreeTextEntry1] : Mrs. Hayward is a 49 year old female here today for initial evaluation of GERD. She has a past medical history significant for Depression, Mitral Valve Prolapse, Hypothyroidism, Asthma, Osteoarthritis. and GERD.

## 2020-09-14 NOTE — CONSULT LETTER
[Dear  ___] : Dear  [unfilled], [Please see my note below.] : Please see my note below. [Courtesy Letter:] : I had the pleasure of seeing your patient, [unfilled], in my office today. [Referral Closing:] : Thank you very much for seeing this patient.  If you have any questions, please do not hesitate to contact me. [Sincerely,] : Sincerely, [FreeTextEntry3] : Adama Minor MD\par Director of Thoracic, [facility]\par Cardiovascular & Thoracic Surgery\par \par Cardiovascular & Thoracic Surgery\par [employer] [employer]\par \par [facility] \par [address] [place] [address]\[place] [address]\par [phone] Tel\par [phone] Fax\par  [FreeTextEntry2] : Noam Palafox MD

## 2021-02-07 ENCOUNTER — TRANSCRIPTION ENCOUNTER (OUTPATIENT)
Age: 51
End: 2021-02-07

## 2021-03-06 ENCOUNTER — TRANSCRIPTION ENCOUNTER (OUTPATIENT)
Age: 51
End: 2021-03-06

## 2021-04-03 ENCOUNTER — TRANSCRIPTION ENCOUNTER (OUTPATIENT)
Age: 51
End: 2021-04-03

## 2021-04-09 NOTE — ED PROVIDER NOTE - RESPIRATORY, MLM
Report given to surgical nurse at the bedside.  Pt used urinal at the bedside prior to leaving. All questions answered at this time.  Consents sent with patient.    Breath sounds clear and equal bilaterally.

## 2021-06-22 NOTE — ED PROVIDER NOTE - ATTENDING CONTRIBUTION TO CARE
48 yo F pw sore throat x past ~ 3 weeks. Pt initially rx with z-pack, Then later was found to have ? vesicles on throat and was rx with antiviral. Now pt with persistent throat discomfort, also with some cough. No fever/chills. no cp/palp. Mild dyspnea with her asthma. NO agg/allev factors. NO other inj or co.  exam: non-toxic, well appearing. neck supple. no meningeal signs. Lungs cta bl, no W/R/r. Nl pharynx, no acute edema / erythema. no vesicles. ABd soft NT ND, no HSM. no other acute findings.  Labs with no leukocytosis, ct with no acute infxn. Steroids, change abx, close, prompt ent fu  Discussed with Patent regarding the nature of the patient's infection.  At length discussion regarding the signs and symptoms of a persistent or worsening infection, the importance of close, prompt medical follow-up, and infection / fever precautions including when to immediately return to the emergency department.  An opportunity to ask questions was given and understanding of all instructions was verbalized. Opioid Pregnancy And Lactation Text: These medications can lead to premature delivery and should be avoided during pregnancy. These medications are also present in breast milk in small amounts.

## 2021-07-28 ENCOUNTER — TRANSCRIPTION ENCOUNTER (OUTPATIENT)
Age: 51
End: 2021-07-28

## 2022-09-28 NOTE — ED ADULT NURSE REASSESSMENT NOTE - CARDIO ASSESSMENT
Outgoing call to the patient, today 9/28/22  Confirming surgery/procedure with Dr. Hanson in the Main OR  Surgery/Procedure date - Friday, 9/30/22  Arrival time - 1000 am  Surgery/Procedure tme - 1200 pm  Patient confirms and understands.  Answered the numerous questions.      Covid screening has been completed on Tuesday, 9/27/22 with a negative result.      As of now, two visitors can accompany patient to surgery    
---

## 2022-10-01 NOTE — ASU DISCHARGE PLAN (ADULT/PEDIATRIC). - ACTIVITY LEVEL
Notified pt's BC from left hand on 9/30 at 1515 grew G+cocci clusters   nothing per rectum/weight bearing as tolerated/no douching/no intercourse/no heavy lifting/no tampons/nothing per vagina/no tub baths

## 2022-12-14 NOTE — PROGRESS NOTE ADULT - SUBJECTIVE AND OBJECTIVE BOX
GYN Progress Note  POD#22   HD#4    Patient seen and examined at bedside, no acute overnight events. She states that pain is better controlled with po medications. She was able to sleep last night. Pt had some water last night. She plans on eating breakfast this AM. She had some nausea last night which has resolved. No vomiting   Patient is ambulating, passing flatus, and voiding spontaneously.  Denies CP, SOB, N/V, fevers, and chills. She had three episodes of diarrhea yesterday     Vital Signs Last 24 Hours  T(C): 36.8 (02-02-18 @ 06:57), Max: 37.2 (02-01-18 @ 21:46)  HR: 66 (02-02-18 @ 06:57) (64 - 70)  BP: 124/70 (02-02-18 @ 06:57) (103/57 - 129/78)  RR: 17 (02-02-18 @ 06:57) (17 - 18)  SpO2: 99% (02-02-18 @ 06:57) (99% - 100%)    I&O's Summary    01 Feb 2018 07:01  -  02 Feb 2018 07:00  --------------------------------------------------------  IN: 250 mL / OUT: 1100 mL / NET: -850 mL        Physical Exam:  General: NAD  CV: NR, RR, S1, S2, no M/R/G  Lungs: CTA-B  Abdomen: Soft, some tenderness in the RLQ (improvement from yesterday's exam), non-distended, no rebound, no guarding,  +BS  Ext: No pain or swelling    Labs:                        11.0   3.66  )-----------( 227      ( 02 Feb 2018 04:40 )             33.5   baso 0.5    eos 4.1    imm gran 0.3    lymph 34.7   mono 7.7    poly 52.7                         10.5   3.41  )-----------( 212      ( 01 Feb 2018 04:10 )             32.5   baso 0.6    eos 3.8    imm gran 0.3    lymph 31.7   mono 7.3    poly 56.3                         11.4   4.73  )-----------( 218      ( 31 Jan 2018 04:26 )             34.0   baso 0.4    eos 1.7    imm gran 0.2    lymph 19.5   mono 7.8    poly 70.4     02-01    145  |  107  |  7   ----------------------------<  93  3.8   |  26  |  0.55    Ca    8.0<L>      01 Feb 2018 04:10    TPro  5.8<L>  /  Alb  3.0<L>  /  TBili  0.2  /  DBili  x   /  AST  18  /  ALT  28  /  AlkPhos  64  02-01          Blood Type: O Positive      MEDICATIONS  (STANDING):  ciprofloxacin     Tablet 500 milliGRAM(s) Oral every 12 hours  heparin  Injectable 5000 Unit(s) SubCutaneous every 12 hours  ibuprofen  Tablet 600 milliGRAM(s) Oral every 6 hours  lactated ringers Bolus 500 milliLiter(s) IV Bolus once  lactated ringers. 1000 milliLiter(s) (125 mL/Hr) IV Continuous <Continuous>  levothyroxine 75 MICROGram(s) Oral daily  metroNIDAZOLE    Tablet 500 milliGRAM(s) Oral every 8 hours  sertraline 200 milliGRAM(s) Oral daily    MEDICATIONS  (PRN):  acetaminophen   Tablet. 650 milliGRAM(s) Oral every 6 hours PRN Mild Pain (1 - 3)  LORazepam     Tablet 0.5 milliGRAM(s) Oral at bedtime PRN Anxiety  oxyCODONE    IR 5 milliGRAM(s) Oral every 4 hours PRN Severe Pain (7 - 10) Patient/Caregiver provided printed discharge information.

## 2023-05-13 NOTE — ASU PREOP CHECKLIST - AS BP NONINV SITE
The patient is Stable - Low risk of patient condition declining or worsening    Shift Goals  Clinical Goals: monitor chest pain  Patient Goals: get cath done    Progress made toward(s) clinical / shift goals:  Plan of care discussed with patient.     Patient is not progressing towards the following goals:       left upper arm

## 2023-08-12 ENCOUNTER — OFFICE (OUTPATIENT)
Dept: URBAN - METROPOLITAN AREA CLINIC 38 | Facility: CLINIC | Age: 53
Setting detail: OPHTHALMOLOGY
End: 2023-08-12
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
## 2023-08-12 PROCEDURE — NO SHOW FE NO SHOW FEE: Performed by: OPTOMETRIST

## 2023-09-07 NOTE — BRIEF OPERATIVE NOTE - PROCEDURE
<<-----Click on this checkbox to enter Procedure Cystoscopy  05/14/2018    Active  [id number]  Excision of endometriosis  05/14/2018    Active  [id number]  Salpingoophorectomy, laparoscopic  05/14/2018  right  Active  [id number]  Salpingectomy  05/14/2018  LEFT  Active  [id number]  TLH (total laparoscopic hysterectomy)  05/14/2018    Active  [id number] Labs/EKG/Imaging Studies/Medications

## 2023-10-05 ENCOUNTER — APPOINTMENT (OUTPATIENT)
Dept: ULTRASOUND IMAGING | Facility: CLINIC | Age: 53
End: 2023-10-05
Payer: COMMERCIAL

## 2023-10-05 ENCOUNTER — OUTPATIENT (OUTPATIENT)
Dept: OUTPATIENT SERVICES | Facility: HOSPITAL | Age: 53
LOS: 1 days | End: 2023-10-05
Payer: COMMERCIAL

## 2023-10-05 ENCOUNTER — APPOINTMENT (OUTPATIENT)
Dept: MAMMOGRAPHY | Facility: CLINIC | Age: 53
End: 2023-10-05
Payer: COMMERCIAL

## 2023-10-05 DIAGNOSIS — N63.20 UNSPECIFIED LUMP IN THE LEFT BREAST, UNSPECIFIED QUADRANT: Chronic | ICD-10-CM

## 2023-10-05 DIAGNOSIS — R52 PAIN, UNSPECIFIED: Chronic | ICD-10-CM

## 2023-10-05 DIAGNOSIS — Z00.8 ENCOUNTER FOR OTHER GENERAL EXAMINATION: ICD-10-CM

## 2023-10-05 DIAGNOSIS — R59.0 LOCALIZED ENLARGED LYMPH NODES: Chronic | ICD-10-CM

## 2023-10-05 PROCEDURE — G0279: CPT

## 2023-10-05 PROCEDURE — 76641 ULTRASOUND BREAST COMPLETE: CPT

## 2023-10-05 PROCEDURE — G0279: CPT | Mod: 26

## 2023-10-05 PROCEDURE — 77066 DX MAMMO INCL CAD BI: CPT | Mod: 26

## 2023-10-05 PROCEDURE — 76641 ULTRASOUND BREAST COMPLETE: CPT | Mod: 26,LT

## 2023-10-05 PROCEDURE — 77066 DX MAMMO INCL CAD BI: CPT

## 2024-12-06 ENCOUNTER — APPOINTMENT (OUTPATIENT)
Dept: ULTRASOUND IMAGING | Facility: CLINIC | Age: 54
End: 2024-12-06
Payer: COMMERCIAL

## 2024-12-06 ENCOUNTER — APPOINTMENT (OUTPATIENT)
Dept: MAMMOGRAPHY | Facility: CLINIC | Age: 54
End: 2024-12-06
Payer: COMMERCIAL

## 2024-12-06 ENCOUNTER — OUTPATIENT (OUTPATIENT)
Dept: OUTPATIENT SERVICES | Facility: HOSPITAL | Age: 54
LOS: 1 days | End: 2024-12-06
Payer: COMMERCIAL

## 2024-12-06 DIAGNOSIS — N63.20 UNSPECIFIED LUMP IN THE LEFT BREAST, UNSPECIFIED QUADRANT: Chronic | ICD-10-CM

## 2024-12-06 DIAGNOSIS — Z00.8 ENCOUNTER FOR OTHER GENERAL EXAMINATION: ICD-10-CM

## 2024-12-06 DIAGNOSIS — R52 PAIN, UNSPECIFIED: Chronic | ICD-10-CM

## 2024-12-06 DIAGNOSIS — R59.0 LOCALIZED ENLARGED LYMPH NODES: Chronic | ICD-10-CM

## 2024-12-06 PROCEDURE — 77067 SCR MAMMO BI INCL CAD: CPT | Mod: 26

## 2024-12-06 PROCEDURE — 77063 BREAST TOMOSYNTHESIS BI: CPT | Mod: 26

## 2024-12-06 PROCEDURE — 76641 ULTRASOUND BREAST COMPLETE: CPT | Mod: 26,50

## 2024-12-06 PROCEDURE — 76641 ULTRASOUND BREAST COMPLETE: CPT

## 2024-12-06 PROCEDURE — 77067 SCR MAMMO BI INCL CAD: CPT

## 2024-12-06 PROCEDURE — 77063 BREAST TOMOSYNTHESIS BI: CPT

## 2025-04-08 ENCOUNTER — NON-APPOINTMENT (OUTPATIENT)
Age: 55
End: 2025-04-08

## 2025-05-02 ENCOUNTER — APPOINTMENT (OUTPATIENT)
Dept: OTOLARYNGOLOGY | Facility: CLINIC | Age: 55
End: 2025-05-02

## 2025-05-31 NOTE — ASU PATIENT PROFILE, ADULT - CAREGIVER RELATION TO PATIENT
General Advair  mcg-21 mcg/inh inhalation aerosol: 2 puff(s) inhaled 2 times a day  Anusol-HC 2.5% topical cream: Apply topically to affected area 2 times a day  famotidine 20 mg oral tablet: 1 tab(s) orally once a day (at bedtime)  Flonase 50 mcg/inh nasal spray: 1 spray(s) nasal once a day  Linzess 290 mcg oral capsule: 1 cap(s) orally once a day  Lipitor 10 mg oral tablet: 1 tab(s) orally once a day (at bedtime)  Multiple Vitamins oral tablet: 1 tab(s) orally once a day  Norvasc 5 mg oral tablet: 1 tab(s) orally once a day  pantoprazole 40 mg oral delayed release tablet: 1 tab(s) orally once a day (before a meal)  polyethylene glycol 3350 oral powder for reconstitution: 17 gram(s) orally 2 times a day  senna oral tablet: 2 tab(s) orally once a day (at bedtime)  Singulair 10 mg oral tablet: 1 tab(s) orally once a day  Tylenol 325 mg oral tablet: 2 tab(s) orally every 6 hours, As Needed for mild pain   spouse

## 2025-07-03 NOTE — CONSULT NOTE ADULT - CONSULT REASON
The patient has been examined and the H&P has been reviewed:    I concur with the findings and no changes have occurred since H&P was written.    Anesthesia/Surgery risks, benefits and alternative options discussed and understood by patient/family.          There are no hospital problems to display for this patient.     abdominal pain, pelvic collection